# Patient Record
Sex: FEMALE | Race: WHITE | Employment: FULL TIME | URBAN - METROPOLITAN AREA
[De-identification: names, ages, dates, MRNs, and addresses within clinical notes are randomized per-mention and may not be internally consistent; named-entity substitution may affect disease eponyms.]

---

## 2017-01-13 DIAGNOSIS — Z91.09 ENVIRONMENTAL ALLERGIES: ICD-10-CM

## 2017-01-13 RX ORDER — MONTELUKAST SODIUM 10 MG/1
TABLET ORAL
Qty: 30 TABLET | Refills: 10 | Status: SHIPPED | OUTPATIENT
Start: 2017-01-13 | End: 2018-01-25 | Stop reason: SDUPTHER

## 2017-09-08 ENCOUNTER — OFFICE VISIT (OUTPATIENT)
Dept: FAMILY MEDICINE CLINIC | Age: 37
End: 2017-09-08

## 2017-09-08 VITALS
BODY MASS INDEX: 23.22 KG/M2 | SYSTOLIC BLOOD PRESSURE: 106 MMHG | OXYGEN SATURATION: 97 % | WEIGHT: 136 LBS | DIASTOLIC BLOOD PRESSURE: 56 MMHG | HEIGHT: 64 IN | HEART RATE: 82 BPM | TEMPERATURE: 98.5 F

## 2017-09-08 DIAGNOSIS — Z00.00 PREVENTATIVE HEALTH CARE: Primary | ICD-10-CM

## 2017-09-08 DIAGNOSIS — Z23 NEED FOR INFLUENZA VACCINATION: ICD-10-CM

## 2017-09-08 DIAGNOSIS — G43.109 MIGRAINE WITH AURA AND WITHOUT STATUS MIGRAINOSUS, NOT INTRACTABLE: ICD-10-CM

## 2017-09-08 DIAGNOSIS — F41.9 ANXIETY: ICD-10-CM

## 2017-09-08 PROCEDURE — 90471 IMMUNIZATION ADMIN: CPT | Performed by: FAMILY MEDICINE

## 2017-09-08 PROCEDURE — 90688 IIV4 VACCINE SPLT 0.5 ML IM: CPT | Performed by: FAMILY MEDICINE

## 2017-09-08 PROCEDURE — 99395 PREV VISIT EST AGE 18-39: CPT | Performed by: FAMILY MEDICINE

## 2017-09-08 RX ORDER — BUPROPION HYDROCHLORIDE 150 MG/1
150 TABLET ORAL EVERY MORNING
Qty: 30 TABLET | Refills: 3 | Status: SHIPPED | OUTPATIENT
Start: 2017-09-08 | End: 2018-07-30

## 2017-09-08 RX ORDER — RIZATRIPTAN BENZOATE 10 MG/1
10 TABLET, ORALLY DISINTEGRATING ORAL
Qty: 12 TABLET | Refills: 3 | Status: SHIPPED | OUTPATIENT
Start: 2017-09-08 | End: 2018-02-20 | Stop reason: SDUPTHER

## 2017-09-08 ASSESSMENT — PATIENT HEALTH QUESTIONNAIRE - PHQ9
SUM OF ALL RESPONSES TO PHQ9 QUESTIONS 1 & 2: 0
2. FEELING DOWN, DEPRESSED OR HOPELESS: 0
1. LITTLE INTEREST OR PLEASURE IN DOING THINGS: 0
SUM OF ALL RESPONSES TO PHQ QUESTIONS 1-9: 0

## 2018-01-25 DIAGNOSIS — Z91.09 ENVIRONMENTAL ALLERGIES: ICD-10-CM

## 2018-01-25 RX ORDER — MONTELUKAST SODIUM 10 MG/1
TABLET ORAL
Qty: 30 TABLET | Refills: 5 | Status: SHIPPED | OUTPATIENT
Start: 2018-01-25 | End: 2018-07-30 | Stop reason: SDUPTHER

## 2018-02-20 ENCOUNTER — OFFICE VISIT (OUTPATIENT)
Dept: FAMILY MEDICINE CLINIC | Age: 38
End: 2018-02-20

## 2018-02-20 VITALS
HEIGHT: 64 IN | WEIGHT: 135 LBS | DIASTOLIC BLOOD PRESSURE: 62 MMHG | SYSTOLIC BLOOD PRESSURE: 106 MMHG | OXYGEN SATURATION: 97 % | TEMPERATURE: 97.7 F | BODY MASS INDEX: 23.05 KG/M2 | HEART RATE: 91 BPM

## 2018-02-20 DIAGNOSIS — F41.9 ANXIETY: Primary | ICD-10-CM

## 2018-02-20 DIAGNOSIS — G43.109 MIGRAINE WITH AURA AND WITHOUT STATUS MIGRAINOSUS, NOT INTRACTABLE: ICD-10-CM

## 2018-02-20 PROCEDURE — 99214 OFFICE O/P EST MOD 30 MIN: CPT | Performed by: FAMILY MEDICINE

## 2018-02-20 RX ORDER — ESCITALOPRAM OXALATE 10 MG/1
5-10 TABLET ORAL DAILY
Qty: 30 TABLET | Refills: 3 | Status: SHIPPED | OUTPATIENT
Start: 2018-02-20 | End: 2018-06-28 | Stop reason: SDUPTHER

## 2018-02-20 RX ORDER — RIZATRIPTAN BENZOATE 10 MG/1
10 TABLET, ORALLY DISINTEGRATING ORAL
Qty: 12 TABLET | Refills: 3 | Status: SHIPPED | OUTPATIENT
Start: 2018-02-20 | End: 2018-07-30 | Stop reason: SDUPTHER

## 2018-03-16 ENCOUNTER — OFFICE VISIT (OUTPATIENT)
Dept: PSYCHOLOGY | Age: 38
End: 2018-03-16

## 2018-03-16 DIAGNOSIS — F41.1 GAD (GENERALIZED ANXIETY DISORDER): Primary | ICD-10-CM

## 2018-03-16 PROCEDURE — 90791 PSYCH DIAGNOSTIC EVALUATION: CPT | Performed by: PSYCHOLOGIST

## 2018-03-16 ASSESSMENT — ANXIETY QUESTIONNAIRES
6. BECOMING EASILY ANNOYED OR IRRITABLE: 3-NEARLY EVERY DAY
7. FEELING AFRAID AS IF SOMETHING AWFUL MIGHT HAPPEN: 1-SEVERAL DAYS
GAD7 TOTAL SCORE: 17
3. WORRYING TOO MUCH ABOUT DIFFERENT THINGS: 2-OVER HALF THE DAYS
5. BEING SO RESTLESS THAT IT IS HARD TO SIT STILL: 2-OVER HALF THE DAYS
4. TROUBLE RELAXING: 3-NEARLY EVERY DAY
2. NOT BEING ABLE TO STOP OR CONTROL WORRYING: 3-NEARLY EVERY DAY
1. FEELING NERVOUS, ANXIOUS, OR ON EDGE: 3-NEARLY EVERY DAY

## 2018-03-16 ASSESSMENT — PATIENT HEALTH QUESTIONNAIRE - PHQ9
SUM OF ALL RESPONSES TO PHQ9 QUESTIONS 1 & 2: 2
3. TROUBLE FALLING OR STAYING ASLEEP: 3
7. TROUBLE CONCENTRATING ON THINGS, SUCH AS READING THE NEWSPAPER OR WATCHING TELEVISION: 3
4. FEELING TIRED OR HAVING LITTLE ENERGY: 3
1. LITTLE INTEREST OR PLEASURE IN DOING THINGS: 1
5. POOR APPETITE OR OVEREATING: 0
SUM OF ALL RESPONSES TO PHQ QUESTIONS 1-9: 12
2. FEELING DOWN, DEPRESSED OR HOPELESS: 1
9. THOUGHTS THAT YOU WOULD BE BETTER OFF DEAD, OR OF HURTING YOURSELF: 0
6. FEELING BAD ABOUT YOURSELF - OR THAT YOU ARE A FAILURE OR HAVE LET YOURSELF OR YOUR FAMILY DOWN: 1
8. MOVING OR SPEAKING SO SLOWLY THAT OTHER PEOPLE COULD HAVE NOTICED. OR THE OPPOSITE, BEING SO FIGETY OR RESTLESS THAT YOU HAVE BEEN MOVING AROUND A LOT MORE THAN USUAL: 0

## 2018-03-16 NOTE — PROGRESS NOTES
recent and remote memory intact  Attention/Concentration    intact  Morbid ideation No  Suicide Assessment    no suicidal ideation    History:    Medications:   Current Outpatient Prescriptions   Medication Sig Dispense Refill    escitalopram (LEXAPRO) 10 MG tablet Take 0.5-1 tablets by mouth daily 30 tablet 3    rizatriptan (MAXALT-MLT) 10 MG disintegrating tablet Take 1 tablet by mouth once as needed for Migraine May repeat in 2 hours if needed 12 tablet 3    montelukast (SINGULAIR) 10 MG tablet TAKE ONE TABLET BY MOUTH DAILY AS NEEDED FOR ALLERGIES 30 tablet 5    buPROPion (WELLBUTRIN XL) 150 MG extended release tablet Take 1 tablet by mouth every morning 30 tablet 3    ondansetron (ZOFRAN) 4 MG tablet Take 1 tablet by mouth every 8 hours as needed for Nausea or Vomiting 20 tablet 0    fluticasone (FLONASE) 50 MCG/ACT nasal spray 1 spray by Nasal route daily. 1 Bottle 3     No current facility-administered medications for this visit. Social History:   Social History     Social History    Marital status:      Spouse name: N/A    Number of children: N/A    Years of education: N/A     Occupational History    Not on file. Social History Main Topics    Smoking status: Never Smoker    Smokeless tobacco: Never Used    Alcohol use No    Drug use: No    Sexual activity: Yes     Partners: Male     Other Topics Concern    Not on file     Social History Narrative    No narrative on file     TOBACCO:   reports that she has never smoked. She has never used smokeless tobacco.  ETOH:   reports that she does not drink alcohol.   Family History:   Family History   Problem Relation Age of Onset    Cancer Paternal Grandfather      stomach cancer, skin cancer    Diabetes Paternal Grandfather     Heart Disease Paternal Grandfather     Diabetes Maternal Grandmother     Heart Disease Maternal Grandmother     Allergic Rhinitis Mother     Other Mother      Cholelith/MVA    High Blood Pressure

## 2018-04-04 ENCOUNTER — OFFICE VISIT (OUTPATIENT)
Dept: PSYCHOLOGY | Age: 38
End: 2018-04-04

## 2018-04-04 DIAGNOSIS — F41.1 GAD (GENERALIZED ANXIETY DISORDER): Primary | ICD-10-CM

## 2018-04-04 PROCEDURE — 90832 PSYTX W PT 30 MINUTES: CPT | Performed by: PSYCHOLOGIST

## 2018-04-20 ENCOUNTER — OFFICE VISIT (OUTPATIENT)
Dept: PSYCHOLOGY | Age: 38
End: 2018-04-20

## 2018-04-20 DIAGNOSIS — F41.1 GAD (GENERALIZED ANXIETY DISORDER): Primary | ICD-10-CM

## 2018-04-20 PROCEDURE — 90832 PSYTX W PT 30 MINUTES: CPT | Performed by: PSYCHOLOGIST

## 2018-04-20 ASSESSMENT — PATIENT HEALTH QUESTIONNAIRE - PHQ9
5. POOR APPETITE OR OVEREATING: 0
3. TROUBLE FALLING OR STAYING ASLEEP: 1
6. FEELING BAD ABOUT YOURSELF - OR THAT YOU ARE A FAILURE OR HAVE LET YOURSELF OR YOUR FAMILY DOWN: 0
SUM OF ALL RESPONSES TO PHQ QUESTIONS 1-9: 7
4. FEELING TIRED OR HAVING LITTLE ENERGY: 3
7. TROUBLE CONCENTRATING ON THINGS, SUCH AS READING THE NEWSPAPER OR WATCHING TELEVISION: 1
SUM OF ALL RESPONSES TO PHQ9 QUESTIONS 1 & 2: 2
2. FEELING DOWN, DEPRESSED OR HOPELESS: 1
8. MOVING OR SPEAKING SO SLOWLY THAT OTHER PEOPLE COULD HAVE NOTICED. OR THE OPPOSITE, BEING SO FIGETY OR RESTLESS THAT YOU HAVE BEEN MOVING AROUND A LOT MORE THAN USUAL: 0
9. THOUGHTS THAT YOU WOULD BE BETTER OFF DEAD, OR OF HURTING YOURSELF: 0
1. LITTLE INTEREST OR PLEASURE IN DOING THINGS: 1

## 2018-04-20 ASSESSMENT — ANXIETY QUESTIONNAIRES
1. FEELING NERVOUS, ANXIOUS, OR ON EDGE: 1-SEVERAL DAYS
6. BECOMING EASILY ANNOYED OR IRRITABLE: 1-SEVERAL DAYS
GAD7 TOTAL SCORE: 3
7. FEELING AFRAID AS IF SOMETHING AWFUL MIGHT HAPPEN: 0-NOT AT ALL SURE
3. WORRYING TOO MUCH ABOUT DIFFERENT THINGS: 0-NOT AT ALL SURE
4. TROUBLE RELAXING: 1-SEVERAL DAYS
5. BEING SO RESTLESS THAT IT IS HARD TO SIT STILL: 0-NOT AT ALL SURE
2. NOT BEING ABLE TO STOP OR CONTROL WORRYING: 0-NOT AT ALL SURE

## 2018-05-11 ENCOUNTER — OFFICE VISIT (OUTPATIENT)
Dept: PSYCHOLOGY | Age: 38
End: 2018-05-11

## 2018-05-11 DIAGNOSIS — F41.1 GAD (GENERALIZED ANXIETY DISORDER): Primary | ICD-10-CM

## 2018-05-11 PROCEDURE — 90832 PSYTX W PT 30 MINUTES: CPT | Performed by: PSYCHOLOGIST

## 2018-06-28 DIAGNOSIS — F41.9 ANXIETY: ICD-10-CM

## 2018-06-28 RX ORDER — ESCITALOPRAM OXALATE 10 MG/1
5-10 TABLET ORAL DAILY
Qty: 30 TABLET | Refills: 3 | Status: SHIPPED | OUTPATIENT
Start: 2018-06-28 | End: 2018-07-30 | Stop reason: SDUPTHER

## 2018-07-30 ENCOUNTER — OFFICE VISIT (OUTPATIENT)
Dept: FAMILY MEDICINE CLINIC | Age: 38
End: 2018-07-30

## 2018-07-30 VITALS
WEIGHT: 136.6 LBS | OXYGEN SATURATION: 98 % | DIASTOLIC BLOOD PRESSURE: 62 MMHG | SYSTOLIC BLOOD PRESSURE: 105 MMHG | BODY MASS INDEX: 23.45 KG/M2 | HEART RATE: 91 BPM

## 2018-07-30 DIAGNOSIS — F41.9 ANXIETY: ICD-10-CM

## 2018-07-30 DIAGNOSIS — Z91.09 ENVIRONMENTAL ALLERGIES: ICD-10-CM

## 2018-07-30 DIAGNOSIS — G43.109 MIGRAINE WITH AURA AND WITHOUT STATUS MIGRAINOSUS, NOT INTRACTABLE: ICD-10-CM

## 2018-07-30 PROCEDURE — 99214 OFFICE O/P EST MOD 30 MIN: CPT | Performed by: NURSE PRACTITIONER

## 2018-07-30 RX ORDER — MONTELUKAST SODIUM 10 MG/1
TABLET ORAL
Qty: 30 TABLET | Refills: 5 | Status: SHIPPED | OUTPATIENT
Start: 2018-07-30 | End: 2019-04-16 | Stop reason: SDUPTHER

## 2018-07-30 RX ORDER — ESCITALOPRAM OXALATE 10 MG/1
10 TABLET ORAL DAILY
Qty: 30 TABLET | Refills: 5 | Status: SHIPPED | OUTPATIENT
Start: 2018-07-30 | End: 2019-04-16 | Stop reason: SDUPTHER

## 2018-07-30 RX ORDER — PROMETHAZINE HYDROCHLORIDE 12.5 MG/1
12.5 TABLET ORAL EVERY 8 HOURS PRN
Qty: 20 TABLET | Refills: 5 | Status: SHIPPED | OUTPATIENT
Start: 2018-07-30 | End: 2019-11-04 | Stop reason: SDUPTHER

## 2018-07-30 RX ORDER — RIZATRIPTAN BENZOATE 10 MG/1
10 TABLET, ORALLY DISINTEGRATING ORAL
Qty: 12 TABLET | Refills: 5 | Status: SHIPPED | OUTPATIENT
Start: 2018-07-30 | End: 2018-12-12

## 2018-07-30 RX ORDER — PROMETHAZINE HYDROCHLORIDE 25 MG/1
25 TABLET ORAL EVERY 8 HOURS PRN
COMMUNITY
End: 2018-07-30

## 2018-07-30 RX ORDER — PROMETHAZINE HYDROCHLORIDE 12.5 MG/1
12.5 TABLET ORAL EVERY 8 HOURS PRN
COMMUNITY
End: 2018-07-30 | Stop reason: SDUPTHER

## 2018-07-30 RX ORDER — ONDANSETRON 4 MG/1
4 TABLET, FILM COATED ORAL EVERY 8 HOURS PRN
Qty: 20 TABLET | Refills: 5 | Status: SHIPPED | OUTPATIENT
Start: 2018-07-30 | End: 2019-11-04 | Stop reason: SDUPTHER

## 2018-07-30 ASSESSMENT — ENCOUNTER SYMPTOMS
COUGH: 0
SHORTNESS OF BREATH: 0
WHEEZING: 0

## 2018-07-30 NOTE — PROGRESS NOTES
5    2. Anxiety    - escitalopram (LEXAPRO) 10 MG tablet; Take 1 tablet by mouth daily  Dispense: 30 tablet; Refill: 5    3. Environmental allergies    - montelukast (SINGULAIR) 10 MG tablet; TAKE ONE TABLET BY MOUTH DAILY AS NEEDED FOR ALLERGIES  Dispense: 30 tablet;  Refill: 5

## 2018-10-26 ENCOUNTER — OFFICE VISIT (OUTPATIENT)
Dept: FAMILY MEDICINE CLINIC | Age: 38
End: 2018-10-26
Payer: COMMERCIAL

## 2018-10-26 VITALS
DIASTOLIC BLOOD PRESSURE: 78 MMHG | OXYGEN SATURATION: 97 % | BODY MASS INDEX: 24.2 KG/M2 | HEART RATE: 73 BPM | SYSTOLIC BLOOD PRESSURE: 118 MMHG | WEIGHT: 141 LBS

## 2018-10-26 DIAGNOSIS — G43.009 MIGRAINE WITHOUT AURA AND WITHOUT STATUS MIGRAINOSUS, NOT INTRACTABLE: Primary | ICD-10-CM

## 2018-10-26 PROCEDURE — 99214 OFFICE O/P EST MOD 30 MIN: CPT | Performed by: NURSE PRACTITIONER

## 2018-10-26 ASSESSMENT — ENCOUNTER SYMPTOMS
RHINORRHEA: 1
SINUS PRESSURE: 1
EYES NEGATIVE: 1
CHEST TIGHTNESS: 0
SCALP TENDERNESS: 1
SINUS PAIN: 1
NAUSEA: 1

## 2018-10-29 ENCOUNTER — TELEPHONE (OUTPATIENT)
Dept: FAMILY MEDICINE CLINIC | Age: 38
End: 2018-10-29

## 2018-12-12 ENCOUNTER — INITIAL CONSULT (OUTPATIENT)
Dept: NEUROLOGY | Age: 38
End: 2018-12-12
Payer: COMMERCIAL

## 2018-12-12 VITALS
WEIGHT: 138 LBS | BODY MASS INDEX: 23.56 KG/M2 | HEART RATE: 71 BPM | DIASTOLIC BLOOD PRESSURE: 63 MMHG | HEIGHT: 64 IN | SYSTOLIC BLOOD PRESSURE: 103 MMHG | OXYGEN SATURATION: 97 %

## 2018-12-12 DIAGNOSIS — G43.119 INTRACTABLE MIGRAINE WITH AURA WITHOUT STATUS MIGRAINOSUS: Primary | ICD-10-CM

## 2018-12-12 DIAGNOSIS — F34.1 DYSTHYMIA: ICD-10-CM

## 2018-12-12 PROCEDURE — 99203 OFFICE O/P NEW LOW 30 MIN: CPT | Performed by: PSYCHIATRY & NEUROLOGY

## 2018-12-12 RX ORDER — M-VIT,TX,IRON,MINS/CALC/FOLIC 27MG-0.4MG
1 TABLET ORAL DAILY
COMMUNITY

## 2018-12-12 NOTE — LETTER
RIBOFLAVIN PO Take by mouth Yes Historical Provider, MD   MAGNESIUM PO Take by mouth Yes Historical Provider, MD   IRON PO Take by mouth Yes Historical Provider, MD   Erenumab-aooe (AIMOVIG) 70 MG/ML SOAJ Inject 70 mg into the skin every 30 days Yes GISELA Millard CNP   promethazine (PHENERGAN) 12.5 MG tablet Take 1 tablet by mouth every 8 hours as needed for Nausea Yes GISELA Headley CNP   ondansetron (ZOFRAN) 4 MG tablet Take 1 tablet by mouth every 8 hours as needed for Nausea or Vomiting Yes GISELA Headley CNP   escitalopram (LEXAPRO) 10 MG tablet Take 1 tablet by mouth daily Yes GISELA Headley CNP   fluticasone (FLONASE) 50 MCG/ACT nasal spray 1 spray by Nasal route daily.  Yes Joo Mehta MD   montelukast (SINGULAIR) 10 MG tablet TAKE ONE TABLET BY MOUTH DAILY AS NEEDED FOR ALLERGIES  GISELA Headley CNP     No Known Allergies  Social History   Substance Use Topics    Smoking status: Never Smoker    Smokeless tobacco: Never Used    Alcohol use No     Family History   Problem Relation Age of Onset    Cancer Paternal Grandfather         stomach cancer, skin cancer    Diabetes Paternal Grandfather     Heart Disease Paternal Grandfather     Diabetes Maternal Grandmother     Heart Disease Maternal Grandmother     Allergic Rhinitis Mother     Other Mother         Cholelith/MVA    High Blood Pressure Father     Mental Illness Sister     Migraines Sister      Past Surgical History:   Procedure Laterality Date    LEG SURGERY Left 1995    had ben placed in upper left leg and then removed 1996       ROS : A 10-12 system review of constitutional, cardiovascular, respiratory, musculoskeletal, endocrine, skin, SHEENT, genitourinary, psychiatric and neurologic systems was obtained and updated today and is unremarkable except as mentioned in my HPI      Vitals:    12/12/18 1027   BP: 103/63   Pulse: 71   SpO2: 97%   Weight: 138 lb (62.6 kg)

## 2018-12-12 NOTE — PROGRESS NOTES
Nerves:   II: Visual fields: Full to confrontation  III: Pupils: equal, round, reactive to light  III,IV,VI: Extra Ocular Movements are intact. No nystagmus  V: Facial sensation is intact to pin prick and light touch  VII: Facial strength and movements: intact and symmetric smile,cheek puffing and eyebrow elevation  VIII: Hearing: Intact to finger rub bilaterally  IX: Palate elevation is symmetric  XI: Shoulder shrug is intact  XII: Tongue movements are normal  Musculoskeletal: 5/5 in all 4 extremities. Normal tone. Reflexes: Bilateral biceps 2/4, triceps 2/4, brachial radialis 2/4, knee 2/4 and ankle 2/4. Planters: flexor bilaterally. Coordination: no pronator drift, no dysmetria. Finger nose finger testing within normal limits. Sensation: normal to all modalities. Gait/Posture: steady      Medical decision making:  I personally reviewed and updated social history, past medical history, medications, allergy, surgical history, and family history as documented in the patient's electronic health records. Labs and/or neuroimaging and other test results reviewed and discussed with the patient. Reviewed notes from other physicians. Provided patient education regarding risk, benefits and treatment options as well as adherence to medication regimen and side effect from these medications. Diagnosis Orders   1. Intractable migraine with aura without status migrainosus     2. Dysthymia           Plan: For migraine prevention: Continue Aimovig injection. Long discussion with the patient regarding side effect of such medication and possible effect on pregnancy, labor and delivery. Continue SSRI  Continue Maxalt or other anti-inflammatory medication for migraine rescue and cyclic migraines.    Continue riboflavin and magnesium  Improving sleep hygiene  Repeat eye exam  Hydration  Headache diary  Follow-up in 3 month  ----------------------------------------------------------------    Patient's instructions:  1- The risk of rebound headaches were discussed. 2- Limit the use of any rescue medications to two days per week. 3- Limit Sodas and Coffee and increase hydration. 4- Migraine diary. 5- Improving sleep hygiene and avoid triggers. 6- The relation between headaches and mood disorders were addressed. 7- Preventive and rescue medications discussion.

## 2019-04-05 ENCOUNTER — OFFICE VISIT (OUTPATIENT)
Dept: FAMILY MEDICINE CLINIC | Age: 39
End: 2019-04-05
Payer: COMMERCIAL

## 2019-04-05 VITALS
HEART RATE: 87 BPM | DIASTOLIC BLOOD PRESSURE: 64 MMHG | OXYGEN SATURATION: 98 % | WEIGHT: 141.6 LBS | SYSTOLIC BLOOD PRESSURE: 118 MMHG | BODY MASS INDEX: 24.17 KG/M2 | HEIGHT: 64 IN

## 2019-04-05 DIAGNOSIS — J02.9 SORE THROAT: Primary | ICD-10-CM

## 2019-04-05 LAB — S PYO AG THROAT QL: NORMAL

## 2019-04-05 PROCEDURE — 99213 OFFICE O/P EST LOW 20 MIN: CPT | Performed by: PHYSICIAN ASSISTANT

## 2019-04-05 PROCEDURE — 87880 STREP A ASSAY W/OPTIC: CPT | Performed by: PHYSICIAN ASSISTANT

## 2019-04-05 NOTE — PROGRESS NOTES
SUBJECTIVE:   Ander Meeks is a 45 y.o. female who complains of congestion, sore throat, post nasal drip and bilateral sinus pain for 3-4 days. She denies a history of fevers and denies a history of asthma. Patient denies smoke cigarettes. No medications taken. OBJECTIVE:  She appears well, vital signs are as noted. Ears normal.  Throat and pharynx with mild erythema and PND. Neck supple. Pos adenopathy in the neck. Nose is not congested. Sinuses non tender. The chest is clear, without wheezes or rales. ASSESSMENT:    Diagnosis Orders   1. Sore throat  POCT rapid strep A         PLAN:  Symptomatic therapy suggested: push fluids and rest. Lack of antibiotic effectiveness discussed with her. Call or return to clinic prn if these symptoms worsen or fail to improve as anticipated.

## 2019-04-16 ENCOUNTER — TELEPHONE (OUTPATIENT)
Dept: FAMILY MEDICINE CLINIC | Age: 39
End: 2019-04-16

## 2019-04-16 DIAGNOSIS — G43.009 MIGRAINE WITHOUT AURA AND WITHOUT STATUS MIGRAINOSUS, NOT INTRACTABLE: ICD-10-CM

## 2019-04-16 DIAGNOSIS — F41.9 ANXIETY: ICD-10-CM

## 2019-04-16 DIAGNOSIS — Z91.09 ENVIRONMENTAL ALLERGIES: ICD-10-CM

## 2019-04-16 RX ORDER — MONTELUKAST SODIUM 10 MG/1
TABLET ORAL
Qty: 30 TABLET | Refills: 5 | Status: SHIPPED | OUTPATIENT
Start: 2019-04-16 | End: 2019-11-04 | Stop reason: SDUPTHER

## 2019-04-16 RX ORDER — ESCITALOPRAM OXALATE 10 MG/1
10 TABLET ORAL DAILY
Qty: 30 TABLET | Refills: 5 | Status: SHIPPED | OUTPATIENT
Start: 2019-04-16 | End: 2019-11-04 | Stop reason: SDUPTHER

## 2019-04-16 NOTE — TELEPHONE ENCOUNTER
Submitted PA for Aimovig 140 Dose 70MG/ML SC SOAJ,  Key: [id number]. Medication has been APPROVED. Please advise patient. Thank you.

## 2019-04-16 NOTE — TELEPHONE ENCOUNTER
Please contact patient. If she is still wanting to take the medication then please move forward with it. If she needs education on how to use the medication, please set up a nurse visit.

## 2019-06-13 ENCOUNTER — OFFICE VISIT (OUTPATIENT)
Dept: NEUROLOGY | Age: 39
End: 2019-06-13
Payer: COMMERCIAL

## 2019-06-13 VITALS
SYSTOLIC BLOOD PRESSURE: 91 MMHG | DIASTOLIC BLOOD PRESSURE: 58 MMHG | OXYGEN SATURATION: 95 % | HEIGHT: 64 IN | BODY MASS INDEX: 23.56 KG/M2 | WEIGHT: 138 LBS | HEART RATE: 84 BPM

## 2019-06-13 DIAGNOSIS — G43.009 MIGRAINE WITHOUT AURA AND WITHOUT STATUS MIGRAINOSUS, NOT INTRACTABLE: ICD-10-CM

## 2019-06-13 DIAGNOSIS — G43.119 INTRACTABLE MIGRAINE WITH AURA WITHOUT STATUS MIGRAINOSUS: Primary | ICD-10-CM

## 2019-06-13 DIAGNOSIS — F34.1 DYSTHYMIA: ICD-10-CM

## 2019-06-13 PROCEDURE — 99213 OFFICE O/P EST LOW 20 MIN: CPT | Performed by: PSYCHIATRY & NEUROLOGY

## 2019-06-13 NOTE — PROGRESS NOTES
Use    Smoking status: Never Smoker    Smokeless tobacco: Never Used   Substance Use Topics    Alcohol use: No     Family History   Problem Relation Age of Onset    Cancer Paternal Grandfather         stomach cancer, skin cancer    Diabetes Paternal Grandfather     Heart Disease Paternal Grandfather     Diabetes Maternal Grandmother     Heart Disease Maternal Grandmother     Allergic Rhinitis Mother     Other Mother         Cholelith/MVA    High Blood Pressure Father     Mental Illness Sister     Migraines Sister      Past Surgical History:   Procedure Laterality Date    LEG SURGERY Left 1995    had ben placed in upper left leg and then removed 1996           Exam:   Constitutional:   Vitals:    06/13/19 1055   BP: (!) 91/58   Pulse: 84   SpO2: 95%   Weight: 138 lb (62.6 kg)   Height: 5' 4\" (1.626 m)       General appearance:  NAD. Eye: No icterus. Neck: supple  Cardiovascular:   No lower leg edema with good pulsation. Mental Status:   Oriented to person, place, problem, and time. Memory: Aware of recent and remote event. Good immediate recall. Intact remote memory  Normal attention span and concentration. Language: intact naming, repeating and fluency   Good fund of Knowledge. Aware of current events and vocabulary   Cranial Nerves:   II: Visual fields: Full to confrontation and nl [facility]. Pupils: equal, round, reactive to light  III,IV,VI: Extra Ocular Movements are intact. No nystagmus  V: Facial sensation is intact   VII: Facial strength and movements: intact and symmetric  VIII: Hearing: Intact to finger rub bilaterally  IX: Palate elevation is symmetric  XI: Shoulder shrug is intact  XII: Tongue movements are normal  Musculoskeletal: 5/5 in all 4 extremities. Tone: Normal tone. Reflexes: Bilateral biceps 2/4, triceps 2/4, brachial radialis 2/4, knee 2/4 and ankle 2/4. Planters: flexor bilaterally. Coordination: no pronator drift, no dysmetria with FNF. Normal REM.    Sensation:

## 2019-11-04 ENCOUNTER — OFFICE VISIT (OUTPATIENT)
Dept: FAMILY MEDICINE CLINIC | Age: 39
End: 2019-11-04
Payer: COMMERCIAL

## 2019-11-04 VITALS
SYSTOLIC BLOOD PRESSURE: 102 MMHG | OXYGEN SATURATION: 98 % | BODY MASS INDEX: 25.06 KG/M2 | TEMPERATURE: 99.1 F | HEART RATE: 78 BPM | DIASTOLIC BLOOD PRESSURE: 62 MMHG | WEIGHT: 146 LBS

## 2019-11-04 DIAGNOSIS — F41.9 ANXIETY: ICD-10-CM

## 2019-11-04 DIAGNOSIS — G43.119 INTRACTABLE MIGRAINE WITH AURA WITHOUT STATUS MIGRAINOSUS: ICD-10-CM

## 2019-11-04 DIAGNOSIS — Z91.09 ENVIRONMENTAL ALLERGIES: ICD-10-CM

## 2019-11-04 DIAGNOSIS — Z23 NEEDS FLU SHOT: ICD-10-CM

## 2019-11-04 DIAGNOSIS — G43.109 MIGRAINE WITH AURA AND WITHOUT STATUS MIGRAINOSUS, NOT INTRACTABLE: ICD-10-CM

## 2019-11-04 DIAGNOSIS — Z00.00 PHYSICAL EXAM: Primary | ICD-10-CM

## 2019-11-04 PROCEDURE — 99395 PREV VISIT EST AGE 18-39: CPT | Performed by: NURSE PRACTITIONER

## 2019-11-04 PROCEDURE — 90686 IIV4 VACC NO PRSV 0.5 ML IM: CPT | Performed by: NURSE PRACTITIONER

## 2019-11-04 PROCEDURE — 90471 IMMUNIZATION ADMIN: CPT | Performed by: NURSE PRACTITIONER

## 2019-11-04 RX ORDER — ESCITALOPRAM OXALATE 10 MG/1
10 TABLET ORAL DAILY
Qty: 30 TABLET | Refills: 5 | Status: SHIPPED | OUTPATIENT
Start: 2019-11-04 | End: 2019-11-26 | Stop reason: SDUPTHER

## 2019-11-04 RX ORDER — MONTELUKAST SODIUM 10 MG/1
TABLET ORAL
Qty: 30 TABLET | Refills: 5 | Status: SHIPPED | OUTPATIENT
Start: 2019-11-04 | End: 2020-08-10 | Stop reason: SDUPTHER

## 2019-11-04 RX ORDER — ONDANSETRON 4 MG/1
4 TABLET, FILM COATED ORAL EVERY 8 HOURS PRN
Qty: 20 TABLET | Refills: 5 | Status: SHIPPED | OUTPATIENT
Start: 2019-11-04 | End: 2021-08-23 | Stop reason: SDUPTHER

## 2019-11-04 RX ORDER — PROMETHAZINE HYDROCHLORIDE 12.5 MG/1
12.5 TABLET ORAL EVERY 8 HOURS PRN
Qty: 20 TABLET | Refills: 5 | Status: SHIPPED | OUTPATIENT
Start: 2019-11-04 | End: 2021-08-23 | Stop reason: SDUPTHER

## 2019-11-04 ASSESSMENT — ENCOUNTER SYMPTOMS
CONSTIPATION: 0
SORE THROAT: 0
EYE PAIN: 0
EYE ITCHING: 0
DIARRHEA: 0
SHORTNESS OF BREATH: 0
WHEEZING: 0
ABDOMINAL PAIN: 1

## 2019-11-04 ASSESSMENT — PATIENT HEALTH QUESTIONNAIRE - PHQ9
1. LITTLE INTEREST OR PLEASURE IN DOING THINGS: 1
SUM OF ALL RESPONSES TO PHQ9 QUESTIONS 1 & 2: 2
SUM OF ALL RESPONSES TO PHQ QUESTIONS 1-9: 2
SUM OF ALL RESPONSES TO PHQ QUESTIONS 1-9: 2
2. FEELING DOWN, DEPRESSED OR HOPELESS: 1

## 2019-11-11 ENCOUNTER — NURSE ONLY (OUTPATIENT)
Dept: FAMILY MEDICINE CLINIC | Age: 39
End: 2019-11-11
Payer: COMMERCIAL

## 2019-11-11 DIAGNOSIS — Z00.00 PHYSICAL EXAM: ICD-10-CM

## 2019-11-11 LAB
A/G RATIO: 2.2 (ref 1.1–2.2)
ALBUMIN SERPL-MCNC: 4.7 G/DL (ref 3.4–5)
ALP BLD-CCNC: 53 U/L (ref 40–129)
ALT SERPL-CCNC: 11 U/L (ref 10–40)
ANION GAP SERPL CALCULATED.3IONS-SCNC: 13 MMOL/L (ref 3–16)
AST SERPL-CCNC: 14 U/L (ref 15–37)
BASOPHILS ABSOLUTE: 0.1 K/UL (ref 0–0.2)
BASOPHILS RELATIVE PERCENT: 0.9 %
BILIRUB SERPL-MCNC: 0.3 MG/DL (ref 0–1)
BUN BLDV-MCNC: 15 MG/DL (ref 7–20)
CALCIUM SERPL-MCNC: 8.9 MG/DL (ref 8.3–10.6)
CHLORIDE BLD-SCNC: 100 MMOL/L (ref 99–110)
CHOLESTEROL, TOTAL: 189 MG/DL (ref 0–199)
CO2: 27 MMOL/L (ref 21–32)
CREAT SERPL-MCNC: 0.8 MG/DL (ref 0.6–1.1)
EOSINOPHILS ABSOLUTE: 0.3 K/UL (ref 0–0.6)
EOSINOPHILS RELATIVE PERCENT: 4.9 %
GFR AFRICAN AMERICAN: >60
GFR NON-AFRICAN AMERICAN: >60
GLOBULIN: 2.1 G/DL
GLUCOSE BLD-MCNC: 87 MG/DL (ref 70–99)
HCT VFR BLD CALC: 39.8 % (ref 36–48)
HDLC SERPL-MCNC: 47 MG/DL (ref 40–60)
HEMOGLOBIN: 12.9 G/DL (ref 12–16)
LDL CHOLESTEROL CALCULATED: 124 MG/DL
LYMPHOCYTES ABSOLUTE: 1.8 K/UL (ref 1–5.1)
LYMPHOCYTES RELATIVE PERCENT: 27.8 %
MCH RBC QN AUTO: 28.8 PG (ref 26–34)
MCHC RBC AUTO-ENTMCNC: 32.5 G/DL (ref 31–36)
MCV RBC AUTO: 88.7 FL (ref 80–100)
MONOCYTES ABSOLUTE: 0.5 K/UL (ref 0–1.3)
MONOCYTES RELATIVE PERCENT: 7.2 %
NEUTROPHILS ABSOLUTE: 3.9 K/UL (ref 1.7–7.7)
NEUTROPHILS RELATIVE PERCENT: 59.2 %
PDW BLD-RTO: 13.5 % (ref 12.4–15.4)
PLATELET # BLD: 291 K/UL (ref 135–450)
PMV BLD AUTO: 8.4 FL (ref 5–10.5)
POTASSIUM SERPL-SCNC: 4.3 MMOL/L (ref 3.5–5.1)
RBC # BLD: 4.49 M/UL (ref 4–5.2)
SODIUM BLD-SCNC: 140 MMOL/L (ref 136–145)
TOTAL PROTEIN: 6.8 G/DL (ref 6.4–8.2)
TRIGL SERPL-MCNC: 91 MG/DL (ref 0–150)
VLDLC SERPL CALC-MCNC: 18 MG/DL
WBC # BLD: 6.6 K/UL (ref 4–11)

## 2019-11-11 PROCEDURE — 36415 COLL VENOUS BLD VENIPUNCTURE: CPT | Performed by: NURSE PRACTITIONER

## 2019-11-26 DIAGNOSIS — F41.9 ANXIETY: ICD-10-CM

## 2019-11-26 DIAGNOSIS — Z23 NEEDS FLU SHOT: ICD-10-CM

## 2019-11-26 RX ORDER — ESCITALOPRAM OXALATE 20 MG/1
20 TABLET ORAL DAILY
Qty: 30 TABLET | Refills: 5 | Status: SHIPPED | OUTPATIENT
Start: 2019-11-26 | End: 2020-06-02 | Stop reason: SDUPTHER

## 2020-03-31 ENCOUNTER — TELEPHONE (OUTPATIENT)
Dept: FAMILY MEDICINE CLINIC | Age: 40
End: 2020-03-31

## 2020-03-31 NOTE — TELEPHONE ENCOUNTER
ears and face is hurting. patient had a cold for about two weeks but its going away. Both feet have spots all over. Left heel hurts. Patient described as broken blood vessels in her feet.  also has a headache off and on for the last 3days

## 2020-03-31 NOTE — TELEPHONE ENCOUNTER
Try OTC meds for cold/sinus like flonase, sudafed etc for feet- has she been on her feet a lot?  For left heel pain she can do some heel stretches- could possibly try some compression stocking for her feet

## 2020-05-26 ENCOUNTER — TELEPHONE (OUTPATIENT)
Dept: FAMILY MEDICINE CLINIC | Age: 40
End: 2020-05-26

## 2020-05-26 NOTE — TELEPHONE ENCOUNTER
Patient is needing a pre op appointment    Surgery date: July 14    Surgery Type/ Body Part: jaw surgery    Surgeon Name : Dr Tyar Robbins    Location for Surgery (what hospital/ office) : [facility]    EKG Needed: unknown

## 2020-06-02 RX ORDER — ESCITALOPRAM OXALATE 20 MG/1
20 TABLET ORAL DAILY
Qty: 30 TABLET | Refills: 5 | Status: SHIPPED
Start: 2020-06-02 | End: 2020-06-12 | Stop reason: ALTCHOICE

## 2020-06-02 RX ORDER — ERENUMAB-AOOE 70 MG/ML
70 INJECTION SUBCUTANEOUS
Qty: 1 PEN | Refills: 5 | Status: SHIPPED | OUTPATIENT
Start: 2020-06-02 | End: 2021-01-04

## 2020-06-11 ENCOUNTER — NURSE TRIAGE (OUTPATIENT)
Dept: OTHER | Facility: CLINIC | Age: 40
End: 2020-06-11

## 2020-06-12 ENCOUNTER — OFFICE VISIT (OUTPATIENT)
Dept: FAMILY MEDICINE CLINIC | Age: 40
End: 2020-06-12
Payer: COMMERCIAL

## 2020-06-12 VITALS
HEART RATE: 81 BPM | TEMPERATURE: 98 F | DIASTOLIC BLOOD PRESSURE: 62 MMHG | SYSTOLIC BLOOD PRESSURE: 112 MMHG | HEIGHT: 64 IN | OXYGEN SATURATION: 98 % | BODY MASS INDEX: 25.4 KG/M2 | WEIGHT: 148.8 LBS

## 2020-06-12 PROCEDURE — 90471 IMMUNIZATION ADMIN: CPT | Performed by: NURSE PRACTITIONER

## 2020-06-12 PROCEDURE — 99214 OFFICE O/P EST MOD 30 MIN: CPT | Performed by: NURSE PRACTITIONER

## 2020-06-12 PROCEDURE — 90715 TDAP VACCINE 7 YRS/> IM: CPT | Performed by: NURSE PRACTITIONER

## 2020-06-12 RX ORDER — TRIAMCINOLONE ACETONIDE 1 MG/G
CREAM TOPICAL
Qty: 45 G | Refills: 0 | Status: SHIPPED | OUTPATIENT
Start: 2020-06-12

## 2020-06-12 ASSESSMENT — ENCOUNTER SYMPTOMS: GASTROINTESTINAL NEGATIVE: 1

## 2020-06-19 ENCOUNTER — E-VISIT (OUTPATIENT)
Dept: FAMILY MEDICINE CLINIC | Age: 40
End: 2020-06-19
Payer: COMMERCIAL

## 2020-06-19 PROCEDURE — 99422 OL DIG E/M SVC 11-20 MIN: CPT | Performed by: FAMILY MEDICINE

## 2020-06-19 RX ORDER — METHYLPREDNISOLONE 4 MG/1
TABLET ORAL
Qty: 1 KIT | Refills: 0 | Status: SHIPPED | OUTPATIENT
Start: 2020-06-19 | End: 2020-07-01 | Stop reason: ALTCHOICE

## 2020-06-19 RX ORDER — HYDROXYZINE HYDROCHLORIDE 25 MG/1
25 TABLET, FILM COATED ORAL 3 TIMES DAILY PRN
Qty: 30 TABLET | Refills: 0 | Status: SHIPPED | OUTPATIENT
Start: 2020-06-19 | End: 2020-06-29

## 2020-06-19 NOTE — PROGRESS NOTES
HPI: per patient's questionnaire  On 6/12/2020 patient was seen by PCP and diagnosed with poison ivy dermatitis, after she fell into some brush, she was prescribed Kenalog cream      EXAM: not applicable    Diagnoses and all orders for this visit:    1. Poison ivy dermatitis  Recurrent  - methylPREDNISolone (MEDROL, BALTAZAR,) 4 MG tablet; Take by mouth, with food. Keep low carb, low salt diet while taking it  Dispense: 1 kit; Refill: 0  - hydrOXYzine (ATARAX) 25 MG tablet; Take 1 tablet by mouth 3 times daily as needed for Itching Causes sedation, take it when at home or at nighttime  Dispense: 30 tablet; Refill: 0  Continue Kenalog cream    Patient was advised to contact PCP if symptoms worsen or failing to change as expected    11-20 minutes were spent on the digital evaluation and management of this patient.

## 2020-07-01 ENCOUNTER — OFFICE VISIT (OUTPATIENT)
Dept: PRIMARY CARE CLINIC | Age: 40
End: 2020-07-01
Payer: COMMERCIAL

## 2020-07-01 PROCEDURE — 99211 OFF/OP EST MAY X REQ PHY/QHP: CPT | Performed by: NURSE PRACTITIONER

## 2020-07-01 NOTE — PROGRESS NOTES
Preoperative Screening for Elective Surgery/Invasive Procedures While COVID-19 present in the community     Have you tested positive or have been told to self-isolate for COVID-19 like symptoms within the past 28 days? no   Do you currently have any of the following symptoms? o Fever >100.0 F or 99.9 F in immunocompromised patients? no  o New onset cough, shortness of breath or difficulty breathing? no  o New onset sore throat, myalgia (muscle aches and pains), headache, loss of taste/smell or diarrhea? no   Have you had a potential exposure to COVID-19 within the past 14 days by:  o Close contact with a confirmed case? no  o Close contact with a healthcare worker,  or essential infrastructure worker (grocery store, restaurant, gas station, public utilities or transportation)? no  o Do you reside in a congregate setting such as; skilled nursing facility, adult home, correctional facility, homeless shelter or other institutional setting? no  o Have you had recent travel to a known COVID-19 hotspot? Indicate if the patient has a positive screen by answering yes to one or more of the above questions. Patients who test positive or screen positive prior to surgery or on the day of surgery should be evaluated in conjunction with the surgeon/proceduralist/anesthesiologist to determine the urgency of the procedure.

## 2020-07-01 NOTE — PROGRESS NOTES
Patient is having a/an jaw with Dr. Lazaro Tierney on 7/7/2020 and was seen at clinic for pre op covid test. . Patient instructed to self quarantine until the procedure.

## 2020-07-02 ENCOUNTER — OFFICE VISIT (OUTPATIENT)
Dept: FAMILY MEDICINE CLINIC | Age: 40
End: 2020-07-02
Payer: COMMERCIAL

## 2020-07-02 VITALS
BODY MASS INDEX: 27.74 KG/M2 | OXYGEN SATURATION: 98 % | HEART RATE: 85 BPM | DIASTOLIC BLOOD PRESSURE: 68 MMHG | SYSTOLIC BLOOD PRESSURE: 104 MMHG | TEMPERATURE: 98 F | WEIGHT: 161.6 LBS

## 2020-07-02 LAB
SARS-COV-2: NOT DETECTED
SOURCE: NORMAL

## 2020-07-02 PROCEDURE — 99242 OFF/OP CONSLTJ NEW/EST SF 20: CPT | Performed by: NURSE PRACTITIONER
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
## 2020-07-06 ENCOUNTER — ANESTHESIA EVENT (OUTPATIENT)
Dept: OPERATING ROOM | Age: 40
End: 2020-07-06
Payer: COMMERCIAL

## 2020-07-07 ENCOUNTER — ANESTHESIA (OUTPATIENT)
Dept: OPERATING ROOM | Age: 40
End: 2020-07-07
Payer: COMMERCIAL

## 2020-07-07 ENCOUNTER — HOSPITAL ENCOUNTER (OUTPATIENT)
Age: 40
Discharge: HOME | End: 2020-07-08
Attending: DENTIST | Admitting: INTERNAL MEDICINE
Payer: COMMERCIAL

## 2020-07-07 VITALS — DIASTOLIC BLOOD PRESSURE: 42 MMHG | SYSTOLIC BLOOD PRESSURE: 89 MMHG | TEMPERATURE: 96.8 F | OXYGEN SATURATION: 100 %

## 2020-07-07 PROBLEM — M26.4: Status: ACTIVE | Noted: 2020-07-07

## 2020-07-07 LAB
ABO/RH: NORMAL
ANTIBODY SCREEN: NORMAL
BASOPHILS ABSOLUTE: 0.1 K/UL (ref 0–0.2)
BASOPHILS RELATIVE PERCENT: 0.8 %
EOSINOPHILS ABSOLUTE: 0.4 K/UL (ref 0–0.6)
EOSINOPHILS RELATIVE PERCENT: 4.5 %
HCT VFR BLD CALC: 37.4 % (ref 36–48)
HEMOGLOBIN: 12.3 G/DL (ref 12–16)
LYMPHOCYTES ABSOLUTE: 1.9 K/UL (ref 1–5.1)
LYMPHOCYTES RELATIVE PERCENT: 24.9 %
MCH RBC QN AUTO: 28.6 PG (ref 26–34)
MCHC RBC AUTO-ENTMCNC: 33 G/DL (ref 31–36)
MCV RBC AUTO: 86.6 FL (ref 80–100)
MONOCYTES ABSOLUTE: 0.6 K/UL (ref 0–1.3)
MONOCYTES RELATIVE PERCENT: 7.9 %
NEUTROPHILS ABSOLUTE: 4.9 K/UL (ref 1.7–7.7)
NEUTROPHILS RELATIVE PERCENT: 61.9 %
PDW BLD-RTO: 13.4 % (ref 12.4–15.4)
PLATELET # BLD: 312 K/UL (ref 135–450)
PMV BLD AUTO: 7.6 FL (ref 5–10.5)
PREGNANCY, URINE: NEGATIVE
RBC # BLD: 4.32 M/UL (ref 4–5.2)
WBC # BLD: 7.8 K/UL (ref 4–11)

## 2020-07-07 PROCEDURE — 3700000001 HC ADD 15 MINUTES (ANESTHESIA): Performed by: DENTIST

## 2020-07-07 PROCEDURE — 2580000003 HC RX 258: Performed by: NURSE ANESTHETIST, CERTIFIED REGISTERED

## 2020-07-07 PROCEDURE — 86900 BLOOD TYPING SEROLOGIC ABO: CPT

## 2020-07-07 PROCEDURE — 7100000000 HC PACU RECOVERY - FIRST 15 MIN: Performed by: DENTIST

## 2020-07-07 PROCEDURE — 2500000003 HC RX 250 WO HCPCS: Performed by: NURSE ANESTHETIST, CERTIFIED REGISTERED

## 2020-07-07 PROCEDURE — C1713 ANCHOR/SCREW BN/BN,TIS/BN: HCPCS | Performed by: DENTIST

## 2020-07-07 PROCEDURE — 2709999900 HC NON-CHARGEABLE SUPPLY: Performed by: DENTIST

## 2020-07-07 PROCEDURE — 86901 BLOOD TYPING SEROLOGIC RH(D): CPT

## 2020-07-07 PROCEDURE — 6360000002 HC RX W HCPCS: Performed by: NURSE ANESTHETIST, CERTIFIED REGISTERED

## 2020-07-07 PROCEDURE — 94761 N-INVAS EAR/PLS OXIMETRY MLT: CPT

## 2020-07-07 PROCEDURE — 6360000002 HC RX W HCPCS: Performed by: ANESTHESIOLOGY

## 2020-07-07 PROCEDURE — 84703 CHORIONIC GONADOTROPIN ASSAY: CPT

## 2020-07-07 PROCEDURE — 3700000000 HC ANESTHESIA ATTENDED CARE: Performed by: DENTIST

## 2020-07-07 PROCEDURE — 6370000000 HC RX 637 (ALT 250 FOR IP): Performed by: DENTIST

## 2020-07-07 PROCEDURE — 2500000003 HC RX 250 WO HCPCS: Performed by: DENTIST

## 2020-07-07 PROCEDURE — 6360000002 HC RX W HCPCS

## 2020-07-07 PROCEDURE — 2700000000 HC OXYGEN THERAPY PER DAY

## 2020-07-07 PROCEDURE — 2780000010 HC IMPLANT OTHER: Performed by: DENTIST

## 2020-07-07 PROCEDURE — 3600000004 HC SURGERY LEVEL 4 BASE: Performed by: DENTIST

## 2020-07-07 PROCEDURE — 85025 COMPLETE CBC W/AUTO DIFF WBC: CPT

## 2020-07-07 PROCEDURE — 6360000002 HC RX W HCPCS: Performed by: DENTIST

## 2020-07-07 PROCEDURE — 86850 RBC ANTIBODY SCREEN: CPT

## 2020-07-07 PROCEDURE — 7100000001 HC PACU RECOVERY - ADDTL 15 MIN: Performed by: DENTIST

## 2020-07-07 PROCEDURE — 2580000003 HC RX 258: Performed by: DENTIST

## 2020-07-07 PROCEDURE — 2720000010 HC SURG SUPPLY STERILE: Performed by: DENTIST

## 2020-07-07 PROCEDURE — 3600000014 HC SURGERY LEVEL 4 ADDTL 15MIN: Performed by: DENTIST

## 2020-07-07 DEVICE — BONE SCREWS, AXS, SELF-DRILLING
Type: IMPLANTABLE DEVICE | Site: MAXILLA | Status: FUNCTIONAL
Brand: AXS

## 2020-07-07 DEVICE — BONE SCREWS, CROSS-PIN: Type: IMPLANTABLE DEVICE | Site: MANDIBLE | Status: FUNCTIONAL

## 2020-07-07 DEVICE — L-PLATE, 100 DEGREE, 2MM BAR, RIGHT, GSP: Type: IMPLANTABLE DEVICE | Site: MAXILLA | Status: FUNCTIONAL

## 2020-07-07 DEVICE — IMPLANTABLE DEVICE: Type: IMPLANTABLE DEVICE | Site: MAXILLA | Status: FUNCTIONAL

## 2020-07-07 DEVICE — L-PLATE, 100 DEGREE, 2MM BAR, LEFT, GSP: Type: IMPLANTABLE DEVICE | Site: MAXILLA | Status: FUNCTIONAL

## 2020-07-07 DEVICE — BONE SCREWS, CROSS-PIN: Type: IMPLANTABLE DEVICE | Site: MAXILLA | Status: FUNCTIONAL

## 2020-07-07 DEVICE — GRAFT BONE 1-4MM 15ML CRUSH CANC FRZ DRY: Type: IMPLANTABLE DEVICE | Site: MAXILLA | Status: FUNCTIONAL

## 2020-07-07 RX ORDER — BACITRACIN 500 [USP'U]/G
OINTMENT OPHTHALMIC PRN
Status: DISCONTINUED | OUTPATIENT
Start: 2020-07-07 | End: 2020-07-07 | Stop reason: HOSPADM

## 2020-07-07 RX ORDER — BUPIVACAINE HYDROCHLORIDE AND EPINEPHRINE 5; 5 MG/ML; UG/ML
INJECTION, SOLUTION PERINEURAL PRN
Status: DISCONTINUED | OUTPATIENT
Start: 2020-07-07 | End: 2020-07-07 | Stop reason: HOSPADM

## 2020-07-07 RX ORDER — HYDRALAZINE HYDROCHLORIDE 20 MG/ML
5 INJECTION INTRAMUSCULAR; INTRAVENOUS EVERY 30 MIN PRN
Status: DISCONTINUED | OUTPATIENT
Start: 2020-07-07 | End: 2020-07-07 | Stop reason: HOSPADM

## 2020-07-07 RX ORDER — CHLORHEXIDINE GLUCONATE 0.12 MG/ML
RINSE ORAL PRN
Status: DISCONTINUED | OUTPATIENT
Start: 2020-07-07 | End: 2020-07-07 | Stop reason: HOSPADM

## 2020-07-07 RX ORDER — OXYCODONE HYDROCHLORIDE 5 MG/1
5 TABLET ORAL EVERY 4 HOURS PRN
Status: DISCONTINUED | OUTPATIENT
Start: 2020-07-07 | End: 2020-07-08 | Stop reason: HOSPADM

## 2020-07-07 RX ORDER — MAGNESIUM HYDROXIDE 1200 MG/15ML
LIQUID ORAL CONTINUOUS PRN
Status: COMPLETED | OUTPATIENT
Start: 2020-07-07 | End: 2020-07-07

## 2020-07-07 RX ORDER — SODIUM CHLORIDE, SODIUM LACTATE, POTASSIUM CHLORIDE, CALCIUM CHLORIDE 600; 310; 30; 20 MG/100ML; MG/100ML; MG/100ML; MG/100ML
INJECTION, SOLUTION INTRAVENOUS CONTINUOUS
Status: DISCONTINUED | OUTPATIENT
Start: 2020-07-07 | End: 2020-07-07

## 2020-07-07 RX ORDER — PROMETHAZINE HYDROCHLORIDE 25 MG/1
12.5 TABLET ORAL EVERY 6 HOURS PRN
Status: DISCONTINUED | OUTPATIENT
Start: 2020-07-07 | End: 2020-07-08 | Stop reason: HOSPADM

## 2020-07-07 RX ORDER — LIDOCAINE HYDROCHLORIDE AND EPINEPHRINE 5; 5 MG/ML; UG/ML
INJECTION, SOLUTION INFILTRATION; PERINEURAL PRN
Status: DISCONTINUED | OUTPATIENT
Start: 2020-07-07 | End: 2020-07-07 | Stop reason: HOSPADM

## 2020-07-07 RX ORDER — MORPHINE SULFATE 2 MG/ML
2 INJECTION, SOLUTION INTRAMUSCULAR; INTRAVENOUS EVERY 4 HOURS PRN
Status: DISCONTINUED | OUTPATIENT
Start: 2020-07-07 | End: 2020-07-08 | Stop reason: HOSPADM

## 2020-07-07 RX ORDER — DIPHENHYDRAMINE HYDROCHLORIDE 50 MG/ML
6.25 INJECTION INTRAMUSCULAR; INTRAVENOUS
Status: DISCONTINUED | OUTPATIENT
Start: 2020-07-07 | End: 2020-07-07 | Stop reason: HOSPADM

## 2020-07-07 RX ORDER — DEXAMETHASONE SODIUM PHOSPHATE 4 MG/ML
4 INJECTION, SOLUTION INTRA-ARTICULAR; INTRALESIONAL; INTRAMUSCULAR; INTRAVENOUS; SOFT TISSUE EVERY 6 HOURS
Status: DISCONTINUED | OUTPATIENT
Start: 2020-07-07 | End: 2020-07-08 | Stop reason: HOSPADM

## 2020-07-07 RX ORDER — PSEUDOEPHEDRINE HYDROCHLORIDE 30 MG/1
30 TABLET ORAL EVERY 4 HOURS PRN
Status: DISCONTINUED | OUTPATIENT
Start: 2020-07-07 | End: 2020-07-08 | Stop reason: HOSPADM

## 2020-07-07 RX ORDER — ONDANSETRON 2 MG/ML
4 INJECTION INTRAMUSCULAR; INTRAVENOUS EVERY 30 MIN PRN
Status: DISCONTINUED | OUTPATIENT
Start: 2020-07-07 | End: 2020-07-07 | Stop reason: HOSPADM

## 2020-07-07 RX ORDER — ONDANSETRON 2 MG/ML
INJECTION INTRAMUSCULAR; INTRAVENOUS PRN
Status: DISCONTINUED | OUTPATIENT
Start: 2020-07-07 | End: 2020-07-07 | Stop reason: SDUPTHER

## 2020-07-07 RX ORDER — PROPOFOL 10 MG/ML
INJECTION, EMULSION INTRAVENOUS PRN
Status: DISCONTINUED | OUTPATIENT
Start: 2020-07-07 | End: 2020-07-07 | Stop reason: SDUPTHER

## 2020-07-07 RX ORDER — BACITRACIN ZINC AND POLYMYXIN B SULFATE 500; 1000 [USP'U]/G; [USP'U]/G
OINTMENT TOPICAL PRN
Status: DISCONTINUED | OUTPATIENT
Start: 2020-07-07 | End: 2020-07-07 | Stop reason: HOSPADM

## 2020-07-07 RX ORDER — IBUPROFEN 600 MG/1
600 TABLET ORAL EVERY 6 HOURS SCHEDULED
Status: DISCONTINUED | OUTPATIENT
Start: 2020-07-07 | End: 2020-07-08 | Stop reason: HOSPADM

## 2020-07-07 RX ORDER — FENTANYL CITRATE 50 UG/ML
INJECTION, SOLUTION INTRAMUSCULAR; INTRAVENOUS PRN
Status: DISCONTINUED | OUTPATIENT
Start: 2020-07-07 | End: 2020-07-07 | Stop reason: SDUPTHER

## 2020-07-07 RX ORDER — OXYCODONE HYDROCHLORIDE AND ACETAMINOPHEN 5; 325 MG/1; MG/1
1 TABLET ORAL PRN
Status: DISCONTINUED | OUTPATIENT
Start: 2020-07-07 | End: 2020-07-07 | Stop reason: HOSPADM

## 2020-07-07 RX ORDER — SODIUM CHLORIDE 0.9 % (FLUSH) 0.9 %
10 SYRINGE (ML) INJECTION PRN
Status: DISCONTINUED | OUTPATIENT
Start: 2020-07-07 | End: 2020-07-08 | Stop reason: HOSPADM

## 2020-07-07 RX ORDER — SODIUM CHLORIDE 0.9 % (FLUSH) 0.9 %
10 SYRINGE (ML) INJECTION PRN
Status: DISCONTINUED | OUTPATIENT
Start: 2020-07-07 | End: 2020-07-07 | Stop reason: HOSPADM

## 2020-07-07 RX ORDER — OXYCODONE HYDROCHLORIDE AND ACETAMINOPHEN 5; 325 MG/1; MG/1
2 TABLET ORAL PRN
Status: DISCONTINUED | OUTPATIENT
Start: 2020-07-07 | End: 2020-07-07 | Stop reason: HOSPADM

## 2020-07-07 RX ORDER — MEPERIDINE HYDROCHLORIDE 50 MG/ML
12.5 INJECTION INTRAMUSCULAR; INTRAVENOUS; SUBCUTANEOUS EVERY 5 MIN PRN
Status: DISCONTINUED | OUTPATIENT
Start: 2020-07-07 | End: 2020-07-07 | Stop reason: HOSPADM

## 2020-07-07 RX ORDER — MIDAZOLAM HYDROCHLORIDE 1 MG/ML
INJECTION INTRAMUSCULAR; INTRAVENOUS PRN
Status: DISCONTINUED | OUTPATIENT
Start: 2020-07-07 | End: 2020-07-07 | Stop reason: SDUPTHER

## 2020-07-07 RX ORDER — CHLORHEXIDINE GLUCONATE 0.12 MG/ML
15 RINSE ORAL 2 TIMES DAILY
Status: DISCONTINUED | OUTPATIENT
Start: 2020-07-07 | End: 2020-07-08 | Stop reason: HOSPADM

## 2020-07-07 RX ORDER — LABETALOL HYDROCHLORIDE 5 MG/ML
5 INJECTION, SOLUTION INTRAVENOUS
Status: DISCONTINUED | OUTPATIENT
Start: 2020-07-07 | End: 2020-07-07 | Stop reason: HOSPADM

## 2020-07-07 RX ORDER — MORPHINE SULFATE 2 MG/ML
2 INJECTION, SOLUTION INTRAMUSCULAR; INTRAVENOUS EVERY 5 MIN PRN
Status: DISCONTINUED | OUTPATIENT
Start: 2020-07-07 | End: 2020-07-07 | Stop reason: ALTCHOICE

## 2020-07-07 RX ORDER — MONTELUKAST SODIUM 10 MG/1
10 TABLET ORAL NIGHTLY
Status: DISCONTINUED | OUTPATIENT
Start: 2020-07-07 | End: 2020-07-08 | Stop reason: HOSPADM

## 2020-07-07 RX ORDER — OXYMETAZOLINE HYDROCHLORIDE 0.05 G/100ML
2 SPRAY NASAL 2 TIMES DAILY
Status: DISCONTINUED | OUTPATIENT
Start: 2020-07-07 | End: 2020-07-08 | Stop reason: HOSPADM

## 2020-07-07 RX ORDER — ONDANSETRON 2 MG/ML
4 INJECTION INTRAMUSCULAR; INTRAVENOUS EVERY 6 HOURS PRN
Status: DISCONTINUED | OUTPATIENT
Start: 2020-07-07 | End: 2020-07-08 | Stop reason: HOSPADM

## 2020-07-07 RX ORDER — SODIUM CHLORIDE 0.9 % (FLUSH) 0.9 %
10 SYRINGE (ML) INJECTION EVERY 12 HOURS SCHEDULED
Status: DISCONTINUED | OUTPATIENT
Start: 2020-07-07 | End: 2020-07-07 | Stop reason: HOSPADM

## 2020-07-07 RX ORDER — SODIUM CHLORIDE 0.9 % (FLUSH) 0.9 %
10 SYRINGE (ML) INJECTION EVERY 12 HOURS SCHEDULED
Status: DISCONTINUED | OUTPATIENT
Start: 2020-07-07 | End: 2020-07-08 | Stop reason: HOSPADM

## 2020-07-07 RX ORDER — MORPHINE SULFATE 2 MG/ML
INJECTION, SOLUTION INTRAMUSCULAR; INTRAVENOUS
Status: COMPLETED
Start: 2020-07-07 | End: 2020-07-07

## 2020-07-07 RX ORDER — ROCURONIUM BROMIDE 10 MG/ML
INJECTION, SOLUTION INTRAVENOUS PRN
Status: DISCONTINUED | OUTPATIENT
Start: 2020-07-07 | End: 2020-07-07 | Stop reason: SDUPTHER

## 2020-07-07 RX ORDER — SODIUM CHLORIDE, SODIUM LACTATE, POTASSIUM CHLORIDE, CALCIUM CHLORIDE 600; 310; 30; 20 MG/100ML; MG/100ML; MG/100ML; MG/100ML
INJECTION, SOLUTION INTRAVENOUS CONTINUOUS PRN
Status: DISCONTINUED | OUTPATIENT
Start: 2020-07-07 | End: 2020-07-07 | Stop reason: SDUPTHER

## 2020-07-07 RX ORDER — LIDOCAINE HYDROCHLORIDE 10 MG/ML
INJECTION, SOLUTION INFILTRATION; PERINEURAL PRN
Status: DISCONTINUED | OUTPATIENT
Start: 2020-07-07 | End: 2020-07-07 | Stop reason: SDUPTHER

## 2020-07-07 RX ORDER — ACETAMINOPHEN 500 MG
1000 TABLET ORAL EVERY 6 HOURS SCHEDULED
Status: DISCONTINUED | OUTPATIENT
Start: 2020-07-07 | End: 2020-07-08 | Stop reason: HOSPADM

## 2020-07-07 RX ORDER — CHLORHEXIDINE GLUCONATE 0.12 MG/ML
15 RINSE ORAL 2 TIMES DAILY
Status: DISCONTINUED | OUTPATIENT
Start: 2020-07-07 | End: 2020-07-07 | Stop reason: HOSPADM

## 2020-07-07 RX ORDER — LIDOCAINE HYDROCHLORIDE 10 MG/ML
0.3 INJECTION, SOLUTION EPIDURAL; INFILTRATION; INTRACAUDAL; PERINEURAL
Status: DISCONTINUED | OUTPATIENT
Start: 2020-07-07 | End: 2020-07-07 | Stop reason: HOSPADM

## 2020-07-07 RX ORDER — DEXAMETHASONE SODIUM PHOSPHATE 10 MG/ML
INJECTION INTRAMUSCULAR; INTRAVENOUS PRN
Status: DISCONTINUED | OUTPATIENT
Start: 2020-07-07 | End: 2020-07-07 | Stop reason: SDUPTHER

## 2020-07-07 RX ORDER — SODIUM CHLORIDE 9 MG/ML
INJECTION, SOLUTION INTRAVENOUS CONTINUOUS
Status: DISCONTINUED | OUTPATIENT
Start: 2020-07-07 | End: 2020-07-08 | Stop reason: HOSPADM

## 2020-07-07 RX ADMIN — MORPHINE SULFATE 2 MG: 2 INJECTION, SOLUTION INTRAMUSCULAR; INTRAVENOUS at 21:15

## 2020-07-07 RX ADMIN — MORPHINE SULFATE 2 MG: 2 INJECTION, SOLUTION INTRAMUSCULAR; INTRAVENOUS at 17:27

## 2020-07-07 RX ADMIN — MIDAZOLAM HYDROCHLORIDE 2 MG: 2 INJECTION, SOLUTION INTRAMUSCULAR; INTRAVENOUS at 07:36

## 2020-07-07 RX ADMIN — MORPHINE SULFATE 2 MG: 2 INJECTION, SOLUTION INTRAMUSCULAR; INTRAVENOUS at 13:37

## 2020-07-07 RX ADMIN — ROCURONIUM BROMIDE 25 MG: 10 SOLUTION INTRAVENOUS at 11:13

## 2020-07-07 RX ADMIN — MORPHINE SULFATE 2 MG: 2 INJECTION, SOLUTION INTRAMUSCULAR; INTRAVENOUS at 14:04

## 2020-07-07 RX ADMIN — PROPOFOL 170 MG: 10 INJECTION, EMULSION INTRAVENOUS at 07:36

## 2020-07-07 RX ADMIN — DEXAMETHASONE SODIUM PHOSPHATE 8 MG: 10 INJECTION INTRAMUSCULAR; INTRAVENOUS at 07:36

## 2020-07-07 RX ADMIN — DEXAMETHASONE SODIUM PHOSPHATE 4 MG: 4 INJECTION, SOLUTION INTRAMUSCULAR; INTRAVENOUS at 18:33

## 2020-07-07 RX ADMIN — LIDOCAINE HYDROCHLORIDE 20 MG: 10 INJECTION, SOLUTION INFILTRATION; PERINEURAL at 07:36

## 2020-07-07 RX ADMIN — OXYCODONE 5 MG: 5 TABLET ORAL at 23:51

## 2020-07-07 RX ADMIN — MORPHINE SULFATE 2 MG: 2 INJECTION, SOLUTION INTRAMUSCULAR; INTRAVENOUS at 14:42

## 2020-07-07 RX ADMIN — SODIUM CHLORIDE, POTASSIUM CHLORIDE, SODIUM LACTATE AND CALCIUM CHLORIDE: 600; 310; 30; 20 INJECTION, SOLUTION INTRAVENOUS at 09:19

## 2020-07-07 RX ADMIN — HYDROMORPHONE HYDROCHLORIDE 0.5 MG: 1 INJECTION, SOLUTION INTRAMUSCULAR; INTRAVENOUS; SUBCUTANEOUS at 13:04

## 2020-07-07 RX ADMIN — SODIUM CHLORIDE, POTASSIUM CHLORIDE, SODIUM LACTATE AND CALCIUM CHLORIDE: 600; 310; 30; 20 INJECTION, SOLUTION INTRAVENOUS at 07:36

## 2020-07-07 RX ADMIN — ONDANSETRON 4 MG: 2 INJECTION INTRAMUSCULAR; INTRAVENOUS at 17:27

## 2020-07-07 RX ADMIN — HYDROMORPHONE HYDROCHLORIDE 0.5 MG: 1 INJECTION, SOLUTION INTRAMUSCULAR; INTRAVENOUS; SUBCUTANEOUS at 13:13

## 2020-07-07 RX ADMIN — ONDANSETRON 4 MG: 2 INJECTION INTRAMUSCULAR; INTRAVENOUS at 07:36

## 2020-07-07 RX ADMIN — PSEUDOEPHEDRINE HCL 30 MG: 30 TABLET, FILM COATED ORAL at 21:14

## 2020-07-07 RX ADMIN — SODIUM CHLORIDE: 9 INJECTION, SOLUTION INTRAVENOUS at 18:32

## 2020-07-07 RX ADMIN — CEFAZOLIN SODIUM 2 G: 10 INJECTION, POWDER, FOR SOLUTION INTRAVENOUS at 18:33

## 2020-07-07 RX ADMIN — CEFAZOLIN SODIUM 2 G: 10 INJECTION, POWDER, FOR SOLUTION INTRAVENOUS at 07:36

## 2020-07-07 RX ADMIN — FENTANYL CITRATE 50 MCG: 50 INJECTION INTRAMUSCULAR; INTRAVENOUS at 08:07

## 2020-07-07 RX ADMIN — SUGAMMADEX 150 MG: 100 INJECTION, SOLUTION INTRAVENOUS at 12:25

## 2020-07-07 RX ADMIN — ROCURONIUM BROMIDE 25 MG: 10 SOLUTION INTRAVENOUS at 08:08

## 2020-07-07 RX ADMIN — PROMETHAZINE HYDROCHLORIDE 12.5 MG: 25 TABLET ORAL at 21:14

## 2020-07-07 RX ADMIN — ROCURONIUM BROMIDE 50 MG: 10 SOLUTION INTRAVENOUS at 07:36

## 2020-07-07 RX ADMIN — FENTANYL CITRATE 50 MCG: 50 INJECTION INTRAMUSCULAR; INTRAVENOUS at 08:46

## 2020-07-07 RX ADMIN — CEFAZOLIN SODIUM 1 G: 10 INJECTION, POWDER, FOR SOLUTION INTRAVENOUS at 11:36

## 2020-07-07 RX ADMIN — HYDROMORPHONE HYDROCHLORIDE 0.5 MG: 1 INJECTION, SOLUTION INTRAMUSCULAR; INTRAVENOUS; SUBCUTANEOUS at 13:22

## 2020-07-07 RX ADMIN — FENTANYL CITRATE 100 MCG: 50 INJECTION INTRAMUSCULAR; INTRAVENOUS at 07:36

## 2020-07-07 RX ADMIN — Medication 10 ML: at 21:57

## 2020-07-07 RX ADMIN — SODIUM CHLORIDE, POTASSIUM CHLORIDE, SODIUM LACTATE AND CALCIUM CHLORIDE: 600; 310; 30; 20 INJECTION, SOLUTION INTRAVENOUS at 09:28

## 2020-07-07 ASSESSMENT — PULMONARY FUNCTION TESTS
PIF_VALUE: 3
PIF_VALUE: 17
PIF_VALUE: 19
PIF_VALUE: 18
PIF_VALUE: 18
PIF_VALUE: 3
PIF_VALUE: 18
PIF_VALUE: 18
PIF_VALUE: 19
PIF_VALUE: 19
PIF_VALUE: 18
PIF_VALUE: 18
PIF_VALUE: 19
PIF_VALUE: 18
PIF_VALUE: 19
PIF_VALUE: 17
PIF_VALUE: 18
PIF_VALUE: 3
PIF_VALUE: 19
PIF_VALUE: 19
PIF_VALUE: 18
PIF_VALUE: 17
PIF_VALUE: 18
PIF_VALUE: 0
PIF_VALUE: 18
PIF_VALUE: 18
PIF_VALUE: 19
PIF_VALUE: 18
PIF_VALUE: 17
PIF_VALUE: 10
PIF_VALUE: 20
PIF_VALUE: 18
PIF_VALUE: 18
PIF_VALUE: 17
PIF_VALUE: 1
PIF_VALUE: 18
PIF_VALUE: 19
PIF_VALUE: 18
PIF_VALUE: 19
PIF_VALUE: 4
PIF_VALUE: 17
PIF_VALUE: 18
PIF_VALUE: 17
PIF_VALUE: 19
PIF_VALUE: 18
PIF_VALUE: 19
PIF_VALUE: 18
PIF_VALUE: 19
PIF_VALUE: 18
PIF_VALUE: 1
PIF_VALUE: 19
PIF_VALUE: 17
PIF_VALUE: 18
PIF_VALUE: 18
PIF_VALUE: 17
PIF_VALUE: 18
PIF_VALUE: 17
PIF_VALUE: 19
PIF_VALUE: 18
PIF_VALUE: 17
PIF_VALUE: 19
PIF_VALUE: 18
PIF_VALUE: 2
PIF_VALUE: 19
PIF_VALUE: 18
PIF_VALUE: 19
PIF_VALUE: 19
PIF_VALUE: 18
PIF_VALUE: 19
PIF_VALUE: 18
PIF_VALUE: 17
PIF_VALUE: 18
PIF_VALUE: 18
PIF_VALUE: 19
PIF_VALUE: 19
PIF_VALUE: 17
PIF_VALUE: 19
PIF_VALUE: 18
PIF_VALUE: 19
PIF_VALUE: 1
PIF_VALUE: 19
PIF_VALUE: 17
PIF_VALUE: 3
PIF_VALUE: 18
PIF_VALUE: 19
PIF_VALUE: 18
PIF_VALUE: 19
PIF_VALUE: 20
PIF_VALUE: 19
PIF_VALUE: 17
PIF_VALUE: 18
PIF_VALUE: 19
PIF_VALUE: 18
PIF_VALUE: 19
PIF_VALUE: 2
PIF_VALUE: 19
PIF_VALUE: 17
PIF_VALUE: 3
PIF_VALUE: 18
PIF_VALUE: 19
PIF_VALUE: 18
PIF_VALUE: 18
PIF_VALUE: 19
PIF_VALUE: 18
PIF_VALUE: 18
PIF_VALUE: 19
PIF_VALUE: 18
PIF_VALUE: 18
PIF_VALUE: 19
PIF_VALUE: 3
PIF_VALUE: 19
PIF_VALUE: 18
PIF_VALUE: 19
PIF_VALUE: 18
PIF_VALUE: 0
PIF_VALUE: 18
PIF_VALUE: 19
PIF_VALUE: 18
PIF_VALUE: 4
PIF_VALUE: 19
PIF_VALUE: 18
PIF_VALUE: 19
PIF_VALUE: 18
PIF_VALUE: 19
PIF_VALUE: 18
PIF_VALUE: 19
PIF_VALUE: 18
PIF_VALUE: 17
PIF_VALUE: 17
PIF_VALUE: 18
PIF_VALUE: 16
PIF_VALUE: 18
PIF_VALUE: 10
PIF_VALUE: 3
PIF_VALUE: 4
PIF_VALUE: 19
PIF_VALUE: 18
PIF_VALUE: 17
PIF_VALUE: 19
PIF_VALUE: 18
PIF_VALUE: 18
PIF_VALUE: 2
PIF_VALUE: 17
PIF_VALUE: 19
PIF_VALUE: 18
PIF_VALUE: 1
PIF_VALUE: 18
PIF_VALUE: 3
PIF_VALUE: 19
PIF_VALUE: 18
PIF_VALUE: 18
PIF_VALUE: 1
PIF_VALUE: 18
PIF_VALUE: 19
PIF_VALUE: 19
PIF_VALUE: 18
PIF_VALUE: 19
PIF_VALUE: 1
PIF_VALUE: 18
PIF_VALUE: 19
PIF_VALUE: 18
PIF_VALUE: 19
PIF_VALUE: 15
PIF_VALUE: 18
PIF_VALUE: 19
PIF_VALUE: 18
PIF_VALUE: 19
PIF_VALUE: 18
PIF_VALUE: 19
PIF_VALUE: 18
PIF_VALUE: 19
PIF_VALUE: 18
PIF_VALUE: 18
PIF_VALUE: 19
PIF_VALUE: 18
PIF_VALUE: 17
PIF_VALUE: 19
PIF_VALUE: 4
PIF_VALUE: 18
PIF_VALUE: 18
PIF_VALUE: 20
PIF_VALUE: 19
PIF_VALUE: 19
PIF_VALUE: 18
PIF_VALUE: 3
PIF_VALUE: 19
PIF_VALUE: 18
PIF_VALUE: 19
PIF_VALUE: 17
PIF_VALUE: 18
PIF_VALUE: 18
PIF_VALUE: 17
PIF_VALUE: 19
PIF_VALUE: 19
PIF_VALUE: 2
PIF_VALUE: 18
PIF_VALUE: 19
PIF_VALUE: 18
PIF_VALUE: 17
PIF_VALUE: 18
PIF_VALUE: 19
PIF_VALUE: 17
PIF_VALUE: 19
PIF_VALUE: 18
PIF_VALUE: 19
PIF_VALUE: 18
PIF_VALUE: 0
PIF_VALUE: 18
PIF_VALUE: 18
PIF_VALUE: 19
PIF_VALUE: 18
PIF_VALUE: 19
PIF_VALUE: 18
PIF_VALUE: 18
PIF_VALUE: 0
PIF_VALUE: 19
PIF_VALUE: 0
PIF_VALUE: 18
PIF_VALUE: 18
PIF_VALUE: 4
PIF_VALUE: 19
PIF_VALUE: 17
PIF_VALUE: 18
PIF_VALUE: 19
PIF_VALUE: 18
PIF_VALUE: 20
PIF_VALUE: 19
PIF_VALUE: 18
PIF_VALUE: 19
PIF_VALUE: 4
PIF_VALUE: 18
PIF_VALUE: 17
PIF_VALUE: 19
PIF_VALUE: 18
PIF_VALUE: 19
PIF_VALUE: 18
PIF_VALUE: 19
PIF_VALUE: 4
PIF_VALUE: 19
PIF_VALUE: 19
PIF_VALUE: 18
PIF_VALUE: 18
PIF_VALUE: 0
PIF_VALUE: 18
PIF_VALUE: 19
PIF_VALUE: 18
PIF_VALUE: 18
PIF_VALUE: 19
PIF_VALUE: 19
PIF_VALUE: 18
PIF_VALUE: 19
PIF_VALUE: 19
PIF_VALUE: 18
PIF_VALUE: 7

## 2020-07-07 ASSESSMENT — PAIN SCALES - GENERAL
PAINLEVEL_OUTOF10: 7
PAINLEVEL_OUTOF10: 6
PAINLEVEL_OUTOF10: 6
PAINLEVEL_OUTOF10: 4
PAINLEVEL_OUTOF10: 5
PAINLEVEL_OUTOF10: 4
PAINLEVEL_OUTOF10: 5
PAINLEVEL_OUTOF10: 4
PAINLEVEL_OUTOF10: 4
PAINLEVEL_OUTOF10: 5
PAINLEVEL_OUTOF10: 0

## 2020-07-07 ASSESSMENT — PAIN DESCRIPTION - PAIN TYPE
TYPE: SURGICAL PAIN

## 2020-07-07 ASSESSMENT — PAIN DESCRIPTION - LOCATION
LOCATION: JAW;MOUTH
LOCATION: MOUTH;JAW
LOCATION: JAW;MOUTH

## 2020-07-07 NOTE — H&P
The H&P was reviewed, the patient was examined, and no change has occurred in the patient's condition since the H&P was completed.     Electronically signed by Jean-Paul Jordan DMD on 7/7/2020 at 7:12 AM

## 2020-07-07 NOTE — OP NOTE
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    Post-Op Diagnosis: Same       Procedure(s):  LEFORT I advancement and cant correction  BILATERAL SAGITTAL SPLIT OSTEOTOMY SETBACK  GENIOPLASTY REDUCTION AND DOWN GRAFT WITH BONE GRAFT  CPT CODE - 76922, 98891, 68912    Surgeon(s):  Harper Aggarwal DMD    Assistant:   Surgical Assistant: Jim Quintero    Anesthesia: General    Estimated Blood Loss (mL): 300    Fluids: 1580QC    Complications: None    Specimens:   none    Implants:  Emmanuel orthognathic lefort plates  Emmanuel BSSO screws  Melrose chin plates      Drains: none    Findings: mental and inferior alveolar nerves intact bilaterally    Detailed Description of Procedure: The patient was placed in supine position on the operating table. Following induction of general anesthesia, a nasal endotracheal tube was successfully placed and secured. Patient was prepped with Betadine solution and draped in sterile fashion. 10 cc of 0.5% lidocaine was infiltrated at surgical sites. K-wire was placed at nasion with preoperative measurements to the bracket of the lateral incisor recorded     Enamoplasty was required and completed with a round bur. Bilateral maxillary vestibular incisions were prepared. Dissection was carried out in a subperiosteal fashion, around the piriform rim and over the zygomatic buttress and posteriorly to the Pterygoid plates with care to remain in subperiosteal plane. Dissection of the nasal mucosa was then completed on the anterior nasal spine and piriform rim, then elevating the nasal mucosa off of the maxilla and palatal bones.      Osteotomies of the lateral maxillary walls were then completed with a sonopet saw under copious saline irrigation using a marking guide. Osteotomies of the nasal septum and lateral walls were then completed with double and single guarded osteotomes respectively. Pterygomaxillary disjunction was completed with a curved osteotome directed inferiorly and anteriorly at the pterygomaxillary junction behind the maxillary tuberosities. The final splint was then secured to the maxilla. Mueller spreaders and a bone hook were then applied to down fracture the maxilla, which was then mobilized in an atraumatic manner with the Rowes mobilizers. She was placed into intermaxlliary fixation with the intermediate splint in place. Bony interferences were removed with a rongeurs and pineapple bur under copious irrigation. The descending palatine vessels were noted to be intact. AND and bilateral inferior pyriform apertures were reduced with a pineapple bur. Measurements were then taken from the previously placed K wire to ensure appropriate left side impaction was achieved to correct the cant. Maxilla was then secured with L-shaped mini plates on the buttresses bilaterally with monocortical bone screws and advancement plates medially along the piriform rims. Intermaxillary fixation was then removed and the mandible was noted to close into the splint passively with the condylar heads in CR. Attention was then directed to the mandible where vestibular incisions were prepared with Bovie deep to periosteum. Subperiosteal dissection over lateral border of the mandible from angle first molar region was exposed. Dissection was also completed along the medial border of the mandible superior to the LOIS. A nerve hook was placed in the mandibular foramen to protect neurovascular bundle. A sonopet saw was used to create bilateral monocortical osteotomies to allow for a sagittal split. The BSSO was completed with gradual application of finger and fiber handle osteotomes and a Smith separator.  Following completion of the sagittal splits of the mandible, the inferior alveolar nerves were noted to remain preserved within the distal segments of the mandible. The final splint and intermaxillary fixation were then applied. The proximal segments were ensured to be fully seated, 4mm of bone were removed from the left proximal segment, then fixated to the distal segments three superior border positional screws on each side. She was then taken out of maxillomandibular fixation and appropriate post-op occlusion verified. Surgical sites were irrigated with copious amounts of saline. The sagittal split incisions were closed with 3-0 chromic in a running configuration. A 3-0 PDS was used for an alar cinch and secured. 3-0 chromic was then used for mucosa in the maxilla. Next, 6 ml of 0.5% marcaine with epinephrine was injected into the site of the Genioplasty. After adequate time for local anesthesia was given a genioplasty incision was made and the mentalis was stripped and the mental nerves on both sides were identified and isolated. The Genioplasty marking guide was then used to make the initial cut. The chin segment was then mobilized. The genioplasty fixation guide was then placed and secured. The segment was then grafted with native bone and corticocancellous bone and then fixated with two L shaped plates with 8 mm screws. The incision was closed using 2.0 vicryl for the mentalis muscle approximation and then 3.0 chromic for the sulcular incision. Another 10 ml of 0.5%marcaine was injected into the lefort and BSSO sites. The throat pack was removed, orogastric tube passed and the stomach decompressed. K wire was removed from nasion. At the conclusion of the procedure the patient emerged from anesthesia without complication. She was taken to the PACU postoperatively.         Electronically signed by Kylie Nolan DMD on 7/7/2020 at 7:12 AM

## 2020-07-07 NOTE — PROGRESS NOTES
Report to [facility]. 4 Eyes Skin Assessment     The patient is being assess for   Post-Op Surgical    I agree that 2 RN's have performed a thorough Head to Toe Skin Assessment on the patient. ALL assessment sites listed below have been assessed. Areas assessed by both nurses:   []   Head, Face, and Ears   [x]   Shoulders, Back, and Chest, Abdomen  [x]   Arms, Elbows, and Hands   [x]   Coccyx, Sacrum, and Ischium  []   Legs, Feet, and Heels        No skin issues. **SHARE this note so that the co-signing nurse is able to place an eSignature**    Co-signer eSignature: Electronically signed by Tamy Pineda RN on 7/7/20 at 3:01 PM EDT    Does the Patient have Skin Breakdown?   No          Chapin Prevention initiated:  No   Wound Care Orders initiated:  No      [facility] nurse consulted for Pressure Injury (Stage 3,4, Unstageable, DTI, NWPT, Complex wounds)and New or Established Ostomies:  NA      Primary Nurse eSignature: Electronically signed by Marc Romeo RN on 7/7/20 at 10:45 PM EDT
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
## 2020-07-07 NOTE — ANESTHESIA POSTPROCEDURE EVALUATION
Department of Anesthesiology  Postprocedure Note    Patient: Zainab Elliott  MRN: [id number]  YOB: 1980  Date of evaluation: 7/7/2020  Time:  4:08 PM     Procedure Summary     Date:  07/07/20 Room / Location:  [facility]    Anesthesia Start:  5142 Anesthesia Stop:  1532    Procedure:  Conor Height SPLIT, BILATERAL MANDIBULAR BONE GRAFTS, GENIOPLASTY REDUCTION WITH SONOPET           JACKLYN  CPT CODE - 51440, 04514, 45688 (N/A Mouth) Diagnosis:       Maxillary hypoplasia      Other jaw asymmetry      (MAXILLARY HYPOPLASIA; OTHER JAW ASYMMETRY)    Surgeon:  Luwanna Nissen, DMD Responsible Provider:  Kiana Pino MD    Anesthesia Type:  general ASA Status:  1          Anesthesia Type: general    Nacho Phase I: Nacho Score: 9    Nacho Phase II:      Last vitals: Reviewed and per EMR flowsheets.        Anesthesia Post Evaluation    Comments: Postoperative Anesthesia Note    Name:    Zainab Elliott  MRN:      [id number]    Patient Vitals in the past 12 hrs:  07/07/20 1530, BP:104/63, Temp:97.5 °F (36.4 °C), Temp src:Axillary, Pulse:85, Resp:16, SpO2:97 %  07/07/20 1528, SpO2:100 %  07/07/20 1440, BP:(!) 116/53, Pulse:85, SpO2:99 %  07/07/20 1425, BP:(!) 111/57, Pulse:92, SpO2:100 %  07/07/20 1420, Pulse:88, SpO2:100 %  07/07/20 1415, Pulse:90, SpO2:100 %  07/07/20 1410, BP:(!) 110/50, Pulse:83, SpO2:99 %  07/07/20 1405, Pulse:87, SpO2:99 %  07/07/20 1355, BP:(!) 106/52, Pulse:89, SpO2:100 %  07/07/20 1340, BP:(!) 107/56, Temp:97.1 °F (36.2 °C), Temp src:Temporal, Pulse:82, SpO2:100 %  07/07/20 1335, BP:(!) 111/51, Pulse:80, SpO2:99 %  07/07/20 1330, BP:(!) 115/55, Pulse:78, SpO2:100 %  07/07/20 1325, BP:(!) 110/48, Pulse:84, SpO2:99 %  07/07/20 1320, BP:(!) 107/55, Pulse:84, SpO2:100 %  07/07/20 1315, BP:(!) 106/53, Pulse:91, SpO2:100 %  07/07/20 1310, BP:(!) 105/47, Pulse:86, SpO2:100 %  07/07/20 1305, BP:(!) 103/48, Pulse:86, SpO2:100 %  07/07/20 1300, BP:(!) 96/43, Pulse:87, SpO2:99 %  07/07/20 1255, BP:(!) 105/54, Pulse:82, SpO2:97 %  07/07/20 1250, BP:(!) 79/32, Temp:97.3 °F (36.3 °C), Temp src:Temporal, Pulse:81, SpO2:98 %  07/07/20 0630, BP:130/71, Temp:98.3 °F (36.8 °C), Temp src:Temporal, Pulse:100, Resp:16, SpO2:100 %, Height:5' 4\" (1.626 m), Weight:162 lb (73.5 kg)     LABS:    CBC  Lab Results       Component                Value               Date/Time                  WBC                      7.8                 07/07/2020 06:50 AM        HGB                      12.3                07/07/2020 06:50 AM        HCT                      37.4                07/07/2020 06:50 AM        PLT                      312                 07/07/2020 06:50 AM   RENAL  Lab Results       Component                Value               Date/Time                  NA                       140                 11/11/2019 08:03 AM        K                        4.3                 11/11/2019 08:03 AM        CL                       100                 11/11/2019 08:03 AM        CO2                      27                  11/11/2019 08:03 AM        BUN                      15                  11/11/2019 08:03 AM        CREATININE               0.8                 11/11/2019 08:03 AM        GLUCOSE                  87                  11/11/2019 08:03 AM   COAGS  No results found for: PROTIME, INR, APTT    Intake & Output:  In: 2300 (I.V.:2300)  Out: 650 (Urine:350)    Nausea & Vomiting:  No    Level of Consciousness:  Awake    Pain Assessment:  Adequate analgesia    Anesthesia Complications:  No apparent anesthetic complications    SUMMARY      Vital signs stable  OK to discharge from Stage I post anesthesia care.   Care transferred from Anesthesiology department on discharge from perioperative area

## 2020-07-08 VITALS
BODY MASS INDEX: 27.66 KG/M2 | TEMPERATURE: 98 F | DIASTOLIC BLOOD PRESSURE: 60 MMHG | WEIGHT: 162 LBS | OXYGEN SATURATION: 100 % | SYSTOLIC BLOOD PRESSURE: 100 MMHG | HEART RATE: 84 BPM | HEIGHT: 64 IN | RESPIRATION RATE: 20 BRPM

## 2020-07-08 LAB
HCT VFR BLD CALC: 31.6 % (ref 36–48)
HEMOGLOBIN: 10.4 G/DL (ref 12–16)

## 2020-07-08 PROCEDURE — 85018 HEMOGLOBIN: CPT

## 2020-07-08 PROCEDURE — 2580000003 HC RX 258: Performed by: DENTIST

## 2020-07-08 PROCEDURE — 6370000000 HC RX 637 (ALT 250 FOR IP)

## 2020-07-08 PROCEDURE — 6360000002 HC RX W HCPCS: Performed by: DENTIST

## 2020-07-08 PROCEDURE — 36415 COLL VENOUS BLD VENIPUNCTURE: CPT

## 2020-07-08 PROCEDURE — 6370000000 HC RX 637 (ALT 250 FOR IP): Performed by: DENTIST

## 2020-07-08 PROCEDURE — 85014 HEMATOCRIT: CPT

## 2020-07-08 RX ADMIN — IBUPROFEN 600 MG: 600 TABLET, FILM COATED ORAL at 04:01

## 2020-07-08 RX ADMIN — IBUPROFEN 600 MG: 600 TABLET, FILM COATED ORAL at 07:54

## 2020-07-08 RX ADMIN — ONDANSETRON 4 MG: 2 INJECTION INTRAMUSCULAR; INTRAVENOUS at 00:38

## 2020-07-08 RX ADMIN — DEXAMETHASONE SODIUM PHOSPHATE 4 MG: 4 INJECTION, SOLUTION INTRAMUSCULAR; INTRAVENOUS at 00:30

## 2020-07-08 RX ADMIN — MORPHINE SULFATE 2 MG: 2 INJECTION, SOLUTION INTRAMUSCULAR; INTRAVENOUS at 01:27

## 2020-07-08 RX ADMIN — DEXAMETHASONE SODIUM PHOSPHATE 4 MG: 4 INJECTION, SOLUTION INTRAMUSCULAR; INTRAVENOUS at 06:38

## 2020-07-08 RX ADMIN — DEXAMETHASONE SODIUM PHOSPHATE 4 MG: 4 INJECTION, SOLUTION INTRAMUSCULAR; INTRAVENOUS at 11:31

## 2020-07-08 RX ADMIN — OXYCODONE 5 MG: 5 TABLET ORAL at 07:54

## 2020-07-08 RX ADMIN — OXYMETAZOLINE HCL 2 SPRAY: 0.05 SPRAY NASAL at 08:56

## 2020-07-08 RX ADMIN — ONDANSETRON 4 MG: 2 INJECTION INTRAMUSCULAR; INTRAVENOUS at 06:50

## 2020-07-08 RX ADMIN — PSEUDOEPHEDRINE HCL 30 MG: 30 TABLET, FILM COATED ORAL at 03:58

## 2020-07-08 RX ADMIN — ACETAMINOPHEN 1000 MG: 500 TABLET ORAL at 11:31

## 2020-07-08 RX ADMIN — CEFAZOLIN SODIUM 2 G: 10 INJECTION, POWDER, FOR SOLUTION INTRAVENOUS at 02:46

## 2020-07-08 RX ADMIN — Medication 15 ML: at 08:58

## 2020-07-08 RX ADMIN — Medication: at 03:59

## 2020-07-08 RX ADMIN — ACETAMINOPHEN 1000 MG: 500 TABLET ORAL at 06:38

## 2020-07-08 ASSESSMENT — PAIN SCALES - GENERAL
PAINLEVEL_OUTOF10: 5
PAINLEVEL_OUTOF10: 7
PAINLEVEL_OUTOF10: 4
PAINLEVEL_OUTOF10: 5

## 2020-07-08 ASSESSMENT — PAIN DESCRIPTION - PAIN TYPE: TYPE: SURGICAL PAIN

## 2020-07-08 ASSESSMENT — PAIN DESCRIPTION - LOCATION: LOCATION: JAW;MOUTH

## 2020-07-08 NOTE — PROGRESS NOTES
Pt given discharge instructions. Dr. Shannon Faith office gave pt prescriptions and postop instructions already. Laurence

## 2020-07-08 NOTE — CARE COORDINATION
Met with patient and  at bedside. IPTA. Has payor source and PCP as well as oral surgeon. Denies further needs.     Jeffrey Briscoe RN

## 2020-07-08 NOTE — PROGRESS NOTES
PT stood,ambulated to bedside commode, no dizziness pt tolerated well.  Bed locked lowest position, non skid socks in place, call light in reach

## 2020-07-08 NOTE — DISCHARGE SUMMARY
Discharge Summary    Date:7/8/2020        Patient Ashish Ashton     YOB: 1980     Age:40 y.o. Admit Date:7/7/2020   Admission Condition:good   Discharged Condition:good  Discharge Date: 07/08/20     Discharge Diagnoses   Active Problems:    Skeletal malocclusion  Resolved Problems:    * No resolved hospital problems. [facility] Stay   Narrative of Hospital Course:  No issues post-op. Stable overnight with no acute events. Voiding, ambulating, tolerating PO and pain well controlled on PO meds. N/V no controlled. Consultants:   None    Time Spent on Discharge:  30 minutes were spent in patient examination, evaluation, counseling as well as medication reconciliation, prescriptions for required medications, discharge plan and follow up. Surgeries/Procedures Performed:  Procedure(s):  LEFORT, SAGITTAL SPLIT, BILATERAL MANDIBULAR BONE GRAFTS, GENIOPLASTY REDUCTION    Treatments:   IV hydration    Exam:  Bilateral facial edema c/w surgery  Head wrap and chin wrap in place  Sutures intact  Eomi, perrla, hearing intact, nares patent. Trachea midline      Radiology Last 7 Days:  No results found. Discharge Plan   Disposition: Home    Provider Follow-Up:   Oniel Bourgeois, APRN - CNP  [id number]  [address] [facility] [phone]    In 3 days  Follow up       Hospital/Incidental Findings Requiring Follow-Up:  none    Patient Instructions   Diet: clear liquids    Activity: activity as tolerated    Other Instructions:   Sinus precautions.      Discharge Medications         Medication List      CONTINUE taking these medications    Aimovig 70 MG/ML Soaj  Generic drug:  Erenumab-aooe  Inject 70 mg into the skin every 30 days     MAGNESIUM PO     montelukast 10 MG tablet  Commonly known as:  SINGULAIR  TAKE ONE TABLET BY MOUTH DAILY AS NEEDED FOR ALLERGIES     ondansetron 4 MG tablet  Commonly known as: Zofran  Take 1 tablet by mouth every 8 hours as needed for Nausea or Vomiting     promethazine 12.5 MG tablet  Commonly known as:  PHENERGAN  Take 1 tablet by mouth every 8 hours as needed for Nausea     RIBOFLAVIN PO     therapeutic multivitamin-minerals tablet     triamcinolone 0.1 % cream  Commonly known as:  KENALOG  Apply topically 2 times daily.             Electronically signed by Sam Demarco DMD on 7/8/20 at 7:43 AM EDT

## 2020-07-08 NOTE — PROGRESS NOTES
Pt alert and orientated. Call light within reach. Dr. Beckman Dears into see pt. Will continue to monitor. Laurence

## 2020-07-30 ENCOUNTER — TELEPHONE (OUTPATIENT)
Dept: ADMINISTRATIVE | Age: 40
End: 2020-07-30

## 2020-07-30 NOTE — TELEPHONE ENCOUNTER
Submitted PA for Aimovig 70MG/ML auto-injectors, Key: [id number]. Medication is APPROVED. Will scan letter once received. Please notify patient. Thank you.

## 2020-08-03 ENCOUNTER — VIRTUAL VISIT (OUTPATIENT)
Dept: PSYCHOLOGY | Age: 40
End: 2020-08-03
Payer: COMMERCIAL

## 2020-08-03 PROCEDURE — 90832 PSYTX W PT 30 MINUTES: CPT | Performed by: PSYCHOLOGIST

## 2020-08-03 NOTE — PATIENT INSTRUCTIONS
Deep Breathing     What Is Deep Breathing? Deep breathing involves using your diaphragm muscle to help bring about a state of physiological relaxation. The diaphragm is a large muscle that rests across the bottom of your rib cage. When you inhale, the diaphragm muscle drops, opening up space so air can come in. When watching someone do this it looks like your stomach is filling with air. This type of breathing helps activate the part of your nervous system that controls relaxation. It can lead to decreased heart rate, blood pressure, decreased muscle tension, and overall feelings of relaxation. Why Be Concerned With How Im Breathing?  To increase your awareness of the role that breathing plays in increased physical tension and in contributing to increasing your bodys stress response.  To lower your level of stress-related arousal and tension.  To give you a method of taking calm, relaxing breaths to break the cycle of increasing arousal during stressful situations. What Is the Best Way To Use Deep Breathing Exercises?  Use deep breathing frequently.  Take deep breaths at the first signs of stress, anxiety, physical tension, or other symptoms.  Schedule time for relaxation. My scheduled time for deep breathing will be bedtime. Relaxation:  Diaphragmatic Breathing             ______________________________________________________________________________    1. Sit in a comfortable position  2. Place one hand on your stomach and the other on your chest  3. Try to breathe so that only your stomach rises and falls    As you inhale, concentrate on your chest remaining relatively still while your stomach rises. It may be helpful for you to imagine that your pants are too big and you need to push your stomach out to hold them up. When exhaling, allow your stomach to fall in and the air to fully escape. Inhale slowly.   You may choose to hold the air in for about a second. Exhale slowly. Dont push the air out, but just let the natural pressure of your body slowly move it out. It is normal for this healthy method of breathing to feel a little awkward at first.  With practice, it will feel more natural.    4.  Take some deep breaths, concentrating on only moving your stomach. Hold each breath for 2 to 3 seconds before exhaling. 5.   Get your mind on your side      One other important factor in getting relaxed is your mind. Your mind and body are connected. The mind influences the body and the body influences the mind. What you do with your mind when you are trying to relax is very important. The key is to avoid thinking about stressful things. You can think about       Neutral things (e.g., counting, saying a word like calm or relax)    Pleasant things (e.g., imagining a pleasant place)    6. Return to regular breathing, continuing to breathe so that only your stomach moves. 7.  It is recommended that you practice 2 times per day, 10 minutes each time. Deep Breathing Exercises  Exercise 1:  The 4-7-8 (or Relaxing Breath) Exercise  This exercise is utterly simple, takes almost no time, requires no equipment and can be done anywhere. Although you can do the exercise in any position, sit with your back straight while learning the exercise. Place the tip of your tongue against the ridge of tissue just behind your upper front teeth, and keep it there through the entire exercise. You will be exhaling through your mouth around your tongue; try pursing your lips slightly if this seems awkward.  Exhale completely through your mouth, making a whoosh sound.  Close your mouth and inhale quietly through your nose to a mental count of four.  Hold your breath for a count of seven.  Exhale completely through your mouth, making a whoosh sound to a count of eight.  This is one breath.  Now inhale again and repeat the cycle three more yourself as you exhale.  The next time you exhale, count \"two,\" and so on up to Edwin. \"   Then begin a new cycle, counting \"one\" on the next exhalation. Never count higher than \"five,\" and count only when you exhale. You will know your attention has wandered when you find yourself up to \"eight,\" \"12,\" even \"19. \"  Try to do 10 minutes of this form of meditation. Progressive Muscle Relaxation  Progressive Muscle Relaxation is a relaxation technique used to release stress. It can relax the muscles and lower blood pressure, heart rate, and respiration. Progressive Muscle Relaxation is the tensing and then relaxing each muscle group of the body, one group at a time. Though this technique is simple it may take several sessions before it is 'mastered.'   Some people prefer to listen to an audiotape (or CD or mp3) that guides one through progressive muscle relaxation. The scripts are usually about 20 minutes long. Progressive muscle relaxation may be done sitting or lying down. Tense up a group of muscles - tense hard but don't strain - and hold for about 5-10 seconds. Release the tension from the muscles all at once. Stay relaxed for 10 - 20 seconds. Some people prefer to count, for example:  Tense for count of 5  Release all at once  Rest for count of 10  . ..or  Tense for count of 10  Release all at once  Rest for count of 20  Pay close attention to the feeling of relaxation when you release the contracted muscles. When going through the muscle groups, some people start with the hands, others with the feet. You may do one side of the body (hand, arm, leg, foot) at a time or do both sides at the same time. Listening to a prerecorded script that guides you through the process is helpful.   Sample of Progressive Muscle Relaxation Exercise:   Hands - Clench fists  tense for 5, release, rest for 10      Right forearms and hands - Extend arm, elbow locked, and bend hand back at the wrist  tense for 5, release, rest for 10      Upper right arm - Bend arms at elbows and flex biceps  tense for 5, release, rest for 10     Forehead - wrinkle forehead into frown, tense, release, rest, and/or raise eyebrows  tense for 5, release, rest for 10      Eyes - close eyes tightly, hold and release  tense for 5, release, rest for 10      Mouth - press lips tightly together  tense for 5, release, rest for 10      Jaw - open mouth wide and stick out tongue  tense for 5, release, rest for 10      Buttocks - tense  tense for 5, release, rest for 10      Abdomen  tense for 5, release, rest for 10      Chest  tense for 5, release, rest for 10      Back - arch back  tense for 5, release, rest for 10      Neck and shoulders  tense for 5, release, rest for 10      Thighs  tense for 5, release, rest for 10      Lower legs and feet - Point toes toward shin  tense for 5, release, rest for 10      Feet - Point toes and curl them under  tense for 5, release, rest for 10     You may repeat relaxing and tensing muscle groups that have you have already done to relax them further. NEGATIVE AUTOMATIC THOUGHTS    1. Mind reading: You assume that you know what people think without having sufficient evidence of their thoughts. \"He thinks I'm a loser. \"  2. Fortune telling: You predict the future- that things will get worse or that there is danger ahead. \"I'll fail that exam\" and \"I won't get the job. \"  3. Catastrophizing: You believe that what has happened or will happen will be so awful and unbearable that you won't be able to stand it. \"It would be terrible if I failed. \"  4. Labeling: You assign global negative traits to yourself and others. \"Im a failure\" or \"He's a rotten person. \"  5. Discounting positives: You claim that the positives that you or others attain are trivial. \"That's what wives are supposed to do--so it doesn't count when she's nice to me. \" \"Those successes were easy, so they don't matter. \"  6. Negative filter:  You focus arguments that might contradict your negative thoughts. For example, when you have the thought \"I'm unlovable\", you reject as irrelevant any evidence that people like you. Consequently, your thought cannot be refuted. \"That's not the real issue. There are deeper problems. There are other factors. \"  17. Judgment Focus: You view yourself, others and events in terms of evaluations of good, bad or superior-inferior, rather than simply describing, accepting, or understanding. You are continually measuring yourself and others according to arbitrary standards, finding that you and others fall short. You are focused on the judgments of others as well as your own judgments of yourself. \"I didn't perform well in college\" or \"If I take up tennis, I won't do well\" or \"Look how successful she is. I'm not successful\". Challenging Upsetting Thinking  ________________________________________________________________________  Examine your thoughts for key words:     1. must, need, got to, have to, should (unrealistic standards)   2. never, always, completely, totally, all everything, everyone (predictions / labeling)   3. awful, terrible, horrible, unbearable, disaster, worst ever (labeling / predictions)   4. jerk, slob, creep, hypocrite, bully, stupid (labels)    Dispute or question the accuracy of the questionable thoughts. 1. Am I upsetting myself unnecessarily? How can I see this another way? 2. Is my thinking working for or against me? How could I view this in a less    upsetting way? 3. What am I demanding must happen? What do I want or prefer, rather than need? 4. Am I making something too terrible? Is it really that awful? What would be so     terrible about that? 5. Am I labeling a person? What is the action that I dont like? 6. Whats untrue about my thoughts? How can I stick to the facts? Whats the proof    for what I am thinking or believing about this? 7.  Am I using extreme, extreme or catastrophic rating words (horrible, terrible, disaster. awful) of an event, a rating which implies that things couldn't be worse and you will not be able to survive the event. B. Instead of using this extreme rating when it doesn't fully apply, think of the event in terms of degree of disappointment or inconvenience. Other words might better describe the relative severity of the event such as annoying, nuisance, irritating, unfortunate, unlucky, frustration, or problem. Ermalene Rink for the extreme or overly general rating of a person (loser, stupid, inconsiderate, pushy, selfish, jerk, incompetent), something which implies that there are good and bad people in the world and this person definitely is part of the bad group. D. Focus your judgment more on the specific action as the problem rather than what you believe is the general type of person involved. Realize that you are on shaky ground whenever you think you can fairly and without a doubt categorize someone as totally fitting a particular type. It is much more relevant to think in terms of the actions  which someone did that you disagree with or you see as mistakes. This pertains to your rating of your self as well as the self of another. Examples:     Alarming Evaluation: Agustin Andujar, trying to stay on this diet is terrible; I can't take it anymore. Reassuring Evaluation: Not being able to eat exactly what I used to sure feels frustrating sometimes, but I can manage. Alarming Evaluation: He just doesn't know what the hell he's doing. Besides that, he doesn't give a damn about anybody but himself. Reassuring Evaluation: I think he's making a mistake here by not involving the rest of the staff in this decision. 3.  Strategies to Change Alarming Expectations. Aminah Andrea out the element of truth or the preference in your alarming expectation.    B.  Remove the absolute demand words (must, should, need, have to) and replace them with
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
## 2020-08-10 ENCOUNTER — OFFICE VISIT (OUTPATIENT)
Dept: FAMILY MEDICINE CLINIC | Age: 40
End: 2020-08-10
Payer: COMMERCIAL

## 2020-08-10 VITALS
BODY MASS INDEX: 24.72 KG/M2 | DIASTOLIC BLOOD PRESSURE: 70 MMHG | SYSTOLIC BLOOD PRESSURE: 100 MMHG | OXYGEN SATURATION: 98 % | WEIGHT: 144 LBS | HEART RATE: 90 BPM | TEMPERATURE: 98 F

## 2020-08-10 LAB
A/G RATIO: 1.5 (ref 1.1–2.2)
ALBUMIN SERPL-MCNC: 4 G/DL (ref 3.4–5)
ALP BLD-CCNC: 61 U/L (ref 40–129)
ALT SERPL-CCNC: 13 U/L (ref 10–40)
ANION GAP SERPL CALCULATED.3IONS-SCNC: 12 MMOL/L (ref 3–16)
AST SERPL-CCNC: 13 U/L (ref 15–37)
BILIRUB SERPL-MCNC: <0.2 MG/DL (ref 0–1)
BUN BLDV-MCNC: 16 MG/DL (ref 7–20)
CALCIUM SERPL-MCNC: 8.9 MG/DL (ref 8.3–10.6)
CHLORIDE BLD-SCNC: 101 MMOL/L (ref 99–110)
CHOLESTEROL, TOTAL: 194 MG/DL (ref 0–199)
CO2: 26 MMOL/L (ref 21–32)
CREAT SERPL-MCNC: 0.9 MG/DL (ref 0.6–1.1)
GFR AFRICAN AMERICAN: >60
GFR NON-AFRICAN AMERICAN: >60
GLOBULIN: 2.6 G/DL
GLUCOSE BLD-MCNC: 85 MG/DL (ref 70–99)
HDLC SERPL-MCNC: 44 MG/DL (ref 40–60)
LDL CHOLESTEROL CALCULATED: 127 MG/DL
POTASSIUM SERPL-SCNC: 4.4 MMOL/L (ref 3.5–5.1)
SODIUM BLD-SCNC: 139 MMOL/L (ref 136–145)
TOTAL PROTEIN: 6.6 G/DL (ref 6.4–8.2)
TRIGL SERPL-MCNC: 114 MG/DL (ref 0–150)
VLDLC SERPL CALC-MCNC: 23 MG/DL

## 2020-08-10 PROCEDURE — 36415 COLL VENOUS BLD VENIPUNCTURE: CPT | Performed by: NURSE PRACTITIONER

## 2020-08-10 PROCEDURE — 99396 PREV VISIT EST AGE 40-64: CPT | Performed by: NURSE PRACTITIONER

## 2020-08-10 RX ORDER — MONTELUKAST SODIUM 10 MG/1
TABLET ORAL
Qty: 30 TABLET | Refills: 5 | Status: SHIPPED | OUTPATIENT
Start: 2020-08-10 | End: 2021-05-03

## 2020-08-10 ASSESSMENT — ENCOUNTER SYMPTOMS
CONSTIPATION: 0
SORE THROAT: 0
SHORTNESS OF BREATH: 0
WHEEZING: 0
ABDOMINAL PAIN: 0
DIARRHEA: 0
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
## 2020-08-28 ENCOUNTER — OFFICE VISIT (OUTPATIENT)
Dept: PSYCHOLOGY | Age: 40
End: 2020-08-28
Payer: COMMERCIAL

## 2020-08-28 VITALS — TEMPERATURE: 98.7 F

## 2020-08-28 PROCEDURE — 90832 PSYTX W PT 30 MINUTES: CPT | Performed by: PSYCHOLOGIST

## 2020-08-28 ASSESSMENT — PATIENT HEALTH QUESTIONNAIRE - PHQ9
SUM OF ALL RESPONSES TO PHQ9 QUESTIONS 1 & 2: 2
SUM OF ALL RESPONSES TO PHQ QUESTIONS 1-9: 2
1. LITTLE INTEREST OR PLEASURE IN DOING THINGS: 1
SUM OF ALL RESPONSES TO PHQ QUESTIONS 1-9: 2
2. FEELING DOWN, DEPRESSED OR HOPELESS: 1

## 2020-08-28 NOTE — PROGRESS NOTES
Behavioral Health Consultation  Annette Zavala, Ph.D.  Psychologist  8/28/2020  3:38 PM EDT      Time spent with Patient: 20 minutes  This is patient's second  [facility] appointment. Reason for Consult:    Chief Complaint   Patient presents with    Anxiety     Referring Provider: Noemi Fregoso MD  [facility], [address] [place] [address]    Feedback given to PCP. S:  Patient reported that her school ended up transitioning to virtual for the first month of the year. Processed how this change impacted her preparation for school. Patient notes that she is still recovering from surgery 7 weeks ago. Notes that she hopes to feel more \"like myself\" when her pain and numbness wear off. Regarding her anxiety, she has been feeling more calm, even-keeled since starting Zoloft. Plan to f/u PRN. O:  MSE:    Appearance: good hygiene   Attitude: cooperative and friendly  Consciousness: alert  Orientation: oriented to person, place, time, general circumstance  Memory: recent and remote memory intact  Attention/Concentration: intact during session  Psychomotor Activity:normal  Eye Contact: normal  Speech: normal rate and volume, well-articulated  Mood: anxious  Affect: anxious  Perception: within normal limits  Thought Content: all-or-none thinking and intrusive thoughts  Thought Process: logical, coherent and goal-directed  Insight: good  Judgment: intact  Ability to understand instructions: Yes  Ability to respond meaningfully: Yes  Morbid Ideation: no   Suicide Assessment: no suicidal ideation, plan, or intent  Homicidal Ideation: no     History:    Medications:   Current Outpatient Medications   Medication Sig Dispense Refill    montelukast (SINGULAIR) 10 MG tablet TAKE ONE TABLET BY MOUTH DAILY AS NEEDED FOR ALLERGIES 30 tablet 5    sertraline (ZOLOFT) 50 MG tablet Take 50 mg by mouth daily      triamcinolone (KENALOG) 0.1 % cream Apply topically 2 times daily.  (Patient not taking: Reported on 7/2/2020) 45 g 0    Erenumab-aooe (AIMOVIG) 70 MG/ML SOAJ Inject 70 mg into the skin every 30 days 1 pen 5    ondansetron (ZOFRAN) 4 MG tablet Take 1 tablet by mouth every 8 hours as needed for Nausea or Vomiting 20 tablet 5    promethazine (PHENERGAN) 12.5 MG tablet Take 1 tablet by mouth every 8 hours as needed for Nausea 20 tablet 5    Multiple Vitamins-Minerals (THERAPEUTIC MULTIVITAMIN-MINERALS) tablet Take 1 tablet by mouth daily      RIBOFLAVIN PO Take by mouth      MAGNESIUM PO Take 1 tablet by mouth daily        No current facility-administered medications for this visit.       Social History:   Social History     Socioeconomic History    Marital status:      Spouse name: Not on file    Number of children: Not on file    Years of education: Not on file    Highest education level: Not on file   Occupational History    Not on file   Social Needs    Financial resource strain: Not on file    Food insecurity     Worry: Not on file     Inability: Not on file    Transportation needs     Medical: Not on file     Non-medical: Not on file   Tobacco Use    Smoking status: Never Smoker    Smokeless tobacco: Never Used   Substance and Sexual Activity    Alcohol use: No    Drug use: No    Sexual activity: Yes     Partners: Male   Lifestyle    Physical activity     Days per week: Not on file     Minutes per session: Not on file    Stress: Not on file   Relationships    Social connections     Talks on phone: Not on file     Gets together: Not on file     Attends Evangelical service: Not on file     Active member of club or organization: Not on file     Attends meetings of clubs or organizations: Not on file     Relationship status: Not on file    Intimate partner violence     Fear of current or ex partner: Not on file     Emotionally abused: Not on file     Physically abused: Not on file     Forced sexual activity: Not on file   Other Topics Concern    Not on file   Social History Narrative    Not on file

## 2021-01-01 DIAGNOSIS — G43.119 INTRACTABLE MIGRAINE WITH AURA WITHOUT STATUS MIGRAINOSUS: ICD-10-CM

## 2021-01-04 RX ORDER — ERENUMAB-AOOE 70 MG/ML
INJECTION SUBCUTANEOUS
Qty: 1 PEN | Refills: 4 | Status: SHIPPED | OUTPATIENT
Start: 2021-01-04 | End: 2021-06-21

## 2021-05-03 DIAGNOSIS — Z91.09 ENVIRONMENTAL ALLERGIES: ICD-10-CM

## 2021-05-03 RX ORDER — MONTELUKAST SODIUM 10 MG/1
TABLET ORAL
Qty: 30 TABLET | Refills: 4 | Status: SHIPPED | OUTPATIENT
Start: 2021-05-03 | End: 2021-08-23 | Stop reason: SDUPTHER

## 2021-06-19 DIAGNOSIS — G43.119 INTRACTABLE MIGRAINE WITH AURA WITHOUT STATUS MIGRAINOSUS: ICD-10-CM

## 2021-06-21 RX ORDER — ERENUMAB-AOOE 70 MG/ML
INJECTION SUBCUTANEOUS
Qty: 1 PEN | Refills: 3 | Status: SHIPPED | OUTPATIENT
Start: 2021-06-21 | End: 2021-08-23 | Stop reason: SDUPTHER

## 2021-08-23 ENCOUNTER — OFFICE VISIT (OUTPATIENT)
Dept: FAMILY MEDICINE CLINIC | Age: 41
End: 2021-08-23
Payer: COMMERCIAL

## 2021-08-23 ENCOUNTER — TELEPHONE (OUTPATIENT)
Dept: ADMINISTRATIVE | Age: 41
End: 2021-08-23

## 2021-08-23 VITALS
DIASTOLIC BLOOD PRESSURE: 60 MMHG | HEART RATE: 80 BPM | BODY MASS INDEX: 25.69 KG/M2 | HEIGHT: 63 IN | WEIGHT: 145 LBS | SYSTOLIC BLOOD PRESSURE: 106 MMHG | OXYGEN SATURATION: 98 %

## 2021-08-23 DIAGNOSIS — F41.9 ANXIETY: ICD-10-CM

## 2021-08-23 DIAGNOSIS — Z00.00 PHYSICAL EXAM: Primary | ICD-10-CM

## 2021-08-23 DIAGNOSIS — G43.119 INTRACTABLE MIGRAINE WITH AURA WITHOUT STATUS MIGRAINOSUS: ICD-10-CM

## 2021-08-23 DIAGNOSIS — G43.109 MIGRAINE WITH AURA AND WITHOUT STATUS MIGRAINOSUS, NOT INTRACTABLE: ICD-10-CM

## 2021-08-23 DIAGNOSIS — Z91.09 ENVIRONMENTAL ALLERGIES: ICD-10-CM

## 2021-08-23 PROCEDURE — 99396 PREV VISIT EST AGE 40-64: CPT | Performed by: NURSE PRACTITIONER

## 2021-08-23 RX ORDER — PROMETHAZINE HYDROCHLORIDE 12.5 MG/1
12.5 TABLET ORAL EVERY 8 HOURS PRN
Qty: 20 TABLET | Refills: 5 | Status: SHIPPED | OUTPATIENT
Start: 2021-08-23

## 2021-08-23 RX ORDER — MONTELUKAST SODIUM 10 MG/1
TABLET ORAL
Qty: 30 TABLET | Refills: 4 | Status: SHIPPED | OUTPATIENT
Start: 2021-08-23 | End: 2022-08-01

## 2021-08-23 RX ORDER — ONDANSETRON 4 MG/1
4 TABLET, FILM COATED ORAL EVERY 8 HOURS PRN
Qty: 20 TABLET | Refills: 5 | Status: SHIPPED | OUTPATIENT
Start: 2021-08-23

## 2021-08-23 RX ORDER — ERENUMAB-AOOE 70 MG/ML
INJECTION SUBCUTANEOUS
Qty: 1 PEN | Refills: 3 | Status: SHIPPED | OUTPATIENT
Start: 2021-08-23 | End: 2022-02-23 | Stop reason: SDUPTHER

## 2021-08-23 ASSESSMENT — ENCOUNTER SYMPTOMS
WHEEZING: 0
DIARRHEA: 0
SHORTNESS OF BREATH: 0
CONSTIPATION: 0
ABDOMINAL PAIN: 0
SORE THROAT: 0
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
## 2021-08-23 NOTE — TELEPHONE ENCOUNTER
Submitted PA for Aimovig 70MG/ML auto-injectors, Key: [id number]. Medication has been APPROVED. Will scan approval letter once received. Please notify patient. Thank you.

## 2021-08-23 NOTE — TELEPHONE ENCOUNTER
Received APPROVAL for Aimovig 70MG/ML auto-injectors. Key [id number]. Will scan approval letter once received. Please notify patient. Thank you.

## 2022-01-25 ENCOUNTER — TELEPHONE (OUTPATIENT)
Dept: FAMILY MEDICINE CLINIC | Age: 42
End: 2022-01-25

## 2022-01-25 DIAGNOSIS — G43.909 MIGRAINE WITHOUT STATUS MIGRAINOSUS, NOT INTRACTABLE, UNSPECIFIED MIGRAINE TYPE: Primary | ICD-10-CM

## 2022-01-25 NOTE — TELEPHONE ENCOUNTER
Submitted PA for Aimovig 70MG/ML auto-injectors, Key: [id number]. Medication has been DENIED. See attached denial letter. Please notify patient. Thank you.

## 2022-01-31 ENCOUNTER — PATIENT MESSAGE (OUTPATIENT)
Dept: FAMILY MEDICINE CLINIC | Age: 42
End: 2022-01-31

## 2022-01-31 DIAGNOSIS — G43.119 INTRACTABLE MIGRAINE WITH AURA WITHOUT STATUS MIGRAINOSUS: ICD-10-CM

## 2022-01-31 RX ORDER — GALCANEZUMAB 120 MG/ML
INJECTION, SOLUTION SUBCUTANEOUS
Qty: 4 EACH | Refills: 3 | Status: SHIPPED | OUTPATIENT
Start: 2022-01-31 | End: 2022-07-26 | Stop reason: SDUPTHER

## 2022-01-31 NOTE — TELEPHONE ENCOUNTER
Let patient know her insurance won't cover the aimovig- has to try ajovy or emgality- I would recommend emgality if she is willing to try this

## 2022-02-23 DIAGNOSIS — G43.119 INTRACTABLE MIGRAINE WITH AURA WITHOUT STATUS MIGRAINOSUS: ICD-10-CM

## 2022-02-23 RX ORDER — ERENUMAB-AOOE 70 MG/ML
INJECTION SUBCUTANEOUS
Qty: 1 ML | OUTPATIENT
Start: 2022-02-23

## 2022-02-23 RX ORDER — ERENUMAB-AOOE 70 MG/ML
INJECTION SUBCUTANEOUS
Qty: 1 PEN | Refills: 3 | Status: SHIPPED | OUTPATIENT
Start: 2022-02-23 | End: 2022-07-26

## 2022-02-23 NOTE — TELEPHONE ENCOUNTER
Last Office Visit  -  8/23/21  Next Office Visit  -  n/a    Last Filled  -    Last UDS -    Contract -

## 2022-03-03 ENCOUNTER — TELEPHONE (OUTPATIENT)
Dept: FAMILY MEDICINE CLINIC | Age: 42
End: 2022-03-03

## 2022-03-03 NOTE — TELEPHONE ENCOUNTER
If they can- try emgality again- we got a PA for aimovig that said she had to try emgality of Ajovy first and I sent something back to them saying I tried to order emgality and got the same PA
Looks like these medications were sent to [facility] so not sure why [facility] is even sending us this or calling us. Also tried calling the number back and it just kept giving me promotions to accept nothing else.
Received call from [address] stating that they received 2 PA's for injectables.  Would like to know if they should continue PA for Erenumab-aooe (AIMOVIG) 70 MG/ML SOAJ OR try another PA for Galcanezumab-gnlm [facility]) 120 MG/ML SOSY     Please call   Ph. [phone]
normal (ped)...

## 2022-03-23 DIAGNOSIS — F41.9 ANXIETY: ICD-10-CM

## 2022-04-25 DIAGNOSIS — F41.9 ANXIETY: ICD-10-CM

## 2022-04-25 NOTE — TELEPHONE ENCOUNTER
Received fax from [address] [place] [address] requesting sertraline (ZOLOFT) 50 MG tablet    Last Office Visit  -  8/23/21  Next Office Visit  -  None

## 2022-05-05 ENCOUNTER — PATIENT MESSAGE (OUTPATIENT)
Dept: FAMILY MEDICINE CLINIC | Age: 42
End: 2022-05-05

## 2022-05-05 NOTE — TELEPHONE ENCOUNTER
From: Daniela Reed  To: Deirdre Brewster  Sent: 5/5/2022 10:54 AM EDT  Subject: Migraine prescription problems again    Currently the insurance is being problematic with the aimovig and using the [employer] access card. After speaking with a person who works with the access card and she said part of the program changed and so many people are having problems with having to show proof of need for aimovig and the coverage. She told me about [employer] ([email or web address] com/ncz-dn-kptcl.html) and that I might get coverage for the prescription through this program. I filled out the application and will be dropping it off at the office today or tomorrow for you to fill out your part. The last step is to have the physician fax it to [phone]    I hope this will make it easier to get this prescription that has been life changing for me.      Thanks

## 2022-05-12 ENCOUNTER — TELEPHONE (OUTPATIENT)
Dept: FAMILY MEDICINE CLINIC | Age: 42
End: 2022-05-12

## 2022-05-12 NOTE — TELEPHONE ENCOUNTER
Antonia Party requesting more information about the Aimovig Assistnce Application form that was sent to them by [facility]. This form was scanned into media 5/9/22.   Megan 30. [phone]

## 2022-05-16 ENCOUNTER — TELEPHONE (OUTPATIENT)
Dept: FAMILY MEDICINE CLINIC | Age: 42
End: 2022-05-16

## 2022-05-16 NOTE — TELEPHONE ENCOUNTER
Submitted PA for Emgality 120MG/ML syringes (migraine), Key: [id number]. Medication has been APPROVED through 08/17/2022. Please notify patient. Thank you.

## 2022-05-16 NOTE — TELEPHONE ENCOUNTER
DEXTER   Spoke with Neelam at [facility] and she states pt is not elligible due to too high of income. Ritchie states with pt having commercial insurance though she could call [employer] access card at [phone].  This will make it to where she can get this medication at a very low cost.     [facility] for pt to call back to notify her

## 2022-05-16 NOTE — TELEPHONE ENCOUNTER
Pt was told by her [employer] to have her PCP call them so that they can push through Prior Authorization for [facility] medication, for her migraines.     Dean Guzman 30. [address] ID# [id number]       Lisset Wong [facility]) [address] [place] [address] [id number] [facility] 80 [address] [facility] -  [phone]

## 2022-05-16 NOTE — TELEPHONE ENCOUNTER
Patient phoned back and was given message. She states she will call her insurance card to see if something has changed with them.

## 2022-05-23 ENCOUNTER — TELEPHONE (OUTPATIENT)
Dept: ADMINISTRATIVE | Age: 42
End: 2022-05-23

## 2022-07-13 ENCOUNTER — TELEPHONE (OUTPATIENT)
Dept: FAMILY MEDICINE CLINIC | Age: 42
End: 2022-07-13

## 2022-07-26 ENCOUNTER — OFFICE VISIT (OUTPATIENT)
Dept: FAMILY MEDICINE CLINIC | Age: 42
End: 2022-07-26
Payer: COMMERCIAL

## 2022-07-26 VITALS
BODY MASS INDEX: 26.75 KG/M2 | HEART RATE: 68 BPM | WEIGHT: 151 LBS | OXYGEN SATURATION: 98 % | SYSTOLIC BLOOD PRESSURE: 102 MMHG | DIASTOLIC BLOOD PRESSURE: 66 MMHG | HEIGHT: 63 IN

## 2022-07-26 DIAGNOSIS — Z86.2 HISTORY OF ANEMIA: ICD-10-CM

## 2022-07-26 DIAGNOSIS — Z00.00 PHYSICAL EXAM: ICD-10-CM

## 2022-07-26 DIAGNOSIS — G43.909 MIGRAINE WITHOUT STATUS MIGRAINOSUS, NOT INTRACTABLE, UNSPECIFIED MIGRAINE TYPE: ICD-10-CM

## 2022-07-26 DIAGNOSIS — R53.83 FATIGUE, UNSPECIFIED TYPE: ICD-10-CM

## 2022-07-26 DIAGNOSIS — Z00.00 PHYSICAL EXAM: Primary | ICD-10-CM

## 2022-07-26 LAB
A/G RATIO: 1.6 (ref 1.1–2.2)
ALBUMIN SERPL-MCNC: 4.2 G/DL (ref 3.4–5)
ALP BLD-CCNC: 65 U/L (ref 40–129)
ALT SERPL-CCNC: 13 U/L (ref 10–40)
ANION GAP SERPL CALCULATED.3IONS-SCNC: 13 MMOL/L (ref 3–16)
AST SERPL-CCNC: 14 U/L (ref 15–37)
BASOPHILS ABSOLUTE: 0 K/UL (ref 0–0.2)
BASOPHILS RELATIVE PERCENT: 0.6 %
BILIRUB SERPL-MCNC: 0.5 MG/DL (ref 0–1)
BUN BLDV-MCNC: 13 MG/DL (ref 7–20)
CALCIUM SERPL-MCNC: 9.1 MG/DL (ref 8.3–10.6)
CHLORIDE BLD-SCNC: 105 MMOL/L (ref 99–110)
CHOLESTEROL, TOTAL: 193 MG/DL (ref 0–199)
CO2: 25 MMOL/L (ref 21–32)
CREAT SERPL-MCNC: 0.7 MG/DL (ref 0.6–1.1)
EOSINOPHILS ABSOLUTE: 0.1 K/UL (ref 0–0.6)
EOSINOPHILS RELATIVE PERCENT: 2 %
FERRITIN: 40.1 NG/ML (ref 15–150)
GFR AFRICAN AMERICAN: >60
GFR NON-AFRICAN AMERICAN: >60
GLUCOSE BLD-MCNC: 85 MG/DL (ref 70–99)
HCT VFR BLD CALC: 36.2 % (ref 36–48)
HDLC SERPL-MCNC: 45 MG/DL (ref 40–60)
HEMOGLOBIN: 12 G/DL (ref 12–16)
IRON SATURATION: 34 % (ref 15–50)
IRON: 95 UG/DL (ref 37–145)
LDL CHOLESTEROL CALCULATED: 126 MG/DL
LYMPHOCYTES ABSOLUTE: 1.7 K/UL (ref 1–5.1)
LYMPHOCYTES RELATIVE PERCENT: 24.2 %
MCH RBC QN AUTO: 28.6 PG (ref 26–34)
MCHC RBC AUTO-ENTMCNC: 33 G/DL (ref 31–36)
MCV RBC AUTO: 86.6 FL (ref 80–100)
MONOCYTES ABSOLUTE: 0.5 K/UL (ref 0–1.3)
MONOCYTES RELATIVE PERCENT: 7.1 %
NEUTROPHILS ABSOLUTE: 4.7 K/UL (ref 1.7–7.7)
NEUTROPHILS RELATIVE PERCENT: 66.1 %
PDW BLD-RTO: 13.5 % (ref 12.4–15.4)
PLATELET # BLD: 303 K/UL (ref 135–450)
PMV BLD AUTO: 8.1 FL (ref 5–10.5)
POTASSIUM SERPL-SCNC: 4.6 MMOL/L (ref 3.5–5.1)
RBC # BLD: 4.18 M/UL (ref 4–5.2)
SODIUM BLD-SCNC: 143 MMOL/L (ref 136–145)
TOTAL IRON BINDING CAPACITY: 282 UG/DL (ref 260–445)
TOTAL PROTEIN: 6.8 G/DL (ref 6.4–8.2)
TRIGL SERPL-MCNC: 109 MG/DL (ref 0–150)
TSH REFLEX FT4: 1.7 UIU/ML (ref 0.27–4.2)
VLDLC SERPL CALC-MCNC: 22 MG/DL
WBC # BLD: 7.1 K/UL (ref 4–11)

## 2022-07-26 PROCEDURE — 99396 PREV VISIT EST AGE 40-64: CPT | Performed by: NURSE PRACTITIONER

## 2022-07-26 RX ORDER — GALCANEZUMAB 120 MG/ML
120 INJECTION, SOLUTION SUBCUTANEOUS
Qty: 4 EACH | Refills: 3 | Status: SHIPPED | OUTPATIENT
Start: 2022-07-26 | End: 2022-10-31

## 2022-07-26 ASSESSMENT — PATIENT HEALTH QUESTIONNAIRE - PHQ9
8. MOVING OR SPEAKING SO SLOWLY THAT OTHER PEOPLE COULD HAVE NOTICED. OR THE OPPOSITE, BEING SO FIGETY OR RESTLESS THAT YOU HAVE BEEN MOVING AROUND A LOT MORE THAN USUAL: 0
1. LITTLE INTEREST OR PLEASURE IN DOING THINGS: 0
SUM OF ALL RESPONSES TO PHQ QUESTIONS 1-9: 2
SUM OF ALL RESPONSES TO PHQ QUESTIONS 1-9: 2
3. TROUBLE FALLING OR STAYING ASLEEP: 1
6. FEELING BAD ABOUT YOURSELF - OR THAT YOU ARE A FAILURE OR HAVE LET YOURSELF OR YOUR FAMILY DOWN: 0
SUM OF ALL RESPONSES TO PHQ9 QUESTIONS 1 & 2: 0
9. THOUGHTS THAT YOU WOULD BE BETTER OFF DEAD, OR OF HURTING YOURSELF: 0
7. TROUBLE CONCENTRATING ON THINGS, SUCH AS READING THE NEWSPAPER OR WATCHING TELEVISION: 0
2. FEELING DOWN, DEPRESSED OR HOPELESS: 0
SUM OF ALL RESPONSES TO PHQ QUESTIONS 1-9: 2
5. POOR APPETITE OR OVEREATING: 0
4. FEELING TIRED OR HAVING LITTLE ENERGY: 1
10. IF YOU CHECKED OFF ANY PROBLEMS, HOW DIFFICULT HAVE THESE PROBLEMS MADE IT FOR YOU TO DO YOUR WORK, TAKE CARE OF THINGS AT HOME, OR GET ALONG WITH OTHER PEOPLE: 1
SUM OF ALL RESPONSES TO PHQ QUESTIONS 1-9: 2

## 2022-07-26 ASSESSMENT — ENCOUNTER SYMPTOMS
SHORTNESS OF BREATH: 0
DIARRHEA: 0
SORE THROAT: 0
WHEEZING: 0
CONSTIPATION: 0
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
## 2022-07-31 DIAGNOSIS — Z91.09 ENVIRONMENTAL ALLERGIES: ICD-10-CM

## 2022-08-01 RX ORDER — MONTELUKAST SODIUM 10 MG/1
TABLET ORAL
Qty: 30 TABLET | Refills: 4 | Status: SHIPPED | OUTPATIENT
Start: 2022-08-01

## 2022-08-25 ENCOUNTER — TELEPHONE (OUTPATIENT)
Dept: ADMINISTRATIVE | Age: 42
End: 2022-08-25

## 2022-08-25 NOTE — TELEPHONE ENCOUNTER
Submitted PA for [facility]  [facility] Key: [id number] STATUS: APPROVAL LETTER ATTACHED. If this requires a response please respond to the pool. [facility] [address] [place] [address]). Please advise patient thank you.

## 2022-10-05 DIAGNOSIS — F41.9 ANXIETY: ICD-10-CM

## 2022-10-31 ENCOUNTER — TELEPHONE (OUTPATIENT)
Dept: FAMILY MEDICINE CLINIC | Age: 42
End: 2022-10-31

## 2022-10-31 RX ORDER — FREMANEZUMAB-VFRM 225 MG/1.5ML
225 INJECTION SUBCUTANEOUS
Qty: 4 ADJUSTABLE DOSE PRE-FILLED PEN SYRINGE | Refills: 4 | Status: SHIPPED | OUTPATIENT
Start: 2022-10-31

## 2022-10-31 NOTE — TELEPHONE ENCOUNTER
Pt calling in today with med issue. The Emgality is not in stock at the pharmacy any longer and not available through the  either. Pt insurance will cover the Ajovy, but will need prior authorization for this. Are we able to prescribe this? Pt stated she is out and needs the med very soon.       [facility]ga [place] rio

## 2022-11-03 NOTE — PROGRESS NOTES
Roxicodone given for pain control, pt swallowed without difficulty. PT had episode of emesis roughly 30 minutes after administration. IV nausea medication given PT declined scheduled tylenol at this time r/t upset stomach\". There are no Wet Read(s) to document.

## 2022-11-15 ENCOUNTER — TELEPHONE (OUTPATIENT)
Dept: ADMINISTRATIVE | Age: 42
End: 2022-11-15

## 2022-12-13 NOTE — TELEPHONE ENCOUNTER
From: Olga Atkinson  To: Marikay Denver  Sent: 1/31/2022 11:57 AM EST  Subject: New prescription    What is the name of the new prescription you put in? Msg from [address] [place] [address] says insurance will not cover [id number] without authorization. We will contact your Dr. Fabián Shields can you or I do at this point? none

## 2023-03-14 ENCOUNTER — HOSPITAL ENCOUNTER (OUTPATIENT)
Dept: CT IMAGING | Age: 43
Discharge: HOME | End: 2023-03-14
Payer: COMMERCIAL

## 2023-03-14 ENCOUNTER — OFFICE VISIT (OUTPATIENT)
Dept: FAMILY MEDICINE CLINIC | Age: 43
End: 2023-03-14
Payer: COMMERCIAL

## 2023-03-14 VITALS
WEIGHT: 155 LBS | OXYGEN SATURATION: 97 % | SYSTOLIC BLOOD PRESSURE: 102 MMHG | BODY MASS INDEX: 27.46 KG/M2 | HEART RATE: 84 BPM | DIASTOLIC BLOOD PRESSURE: 70 MMHG

## 2023-03-14 DIAGNOSIS — R10.32 LEFT LOWER QUADRANT ABDOMINAL PAIN: ICD-10-CM

## 2023-03-14 DIAGNOSIS — R10.32 LEFT LOWER QUADRANT ABDOMINAL PAIN: Primary | ICD-10-CM

## 2023-03-14 LAB
BILIRUBIN, POC: NEGATIVE
BLOOD URINE, POC: NEGATIVE
CLARITY, POC: CLEAR
COLOR, POC: YELLOW
GLUCOSE URINE, POC: NEGATIVE
KETONES, POC: NEGATIVE
LEUKOCYTE EST, POC: NEGATIVE
NITRITE, POC: NEGATIVE
PH, POC: 7
PROTEIN, POC: NEGATIVE
SPECIFIC GRAVITY, POC: 1.02
UROBILINOGEN, POC: NORMAL

## 2023-03-14 PROCEDURE — 81002 URINALYSIS NONAUTO W/O SCOPE: CPT | Performed by: NURSE PRACTITIONER

## 2023-03-14 PROCEDURE — 74176 CT ABD & PELVIS W/O CONTRAST: CPT

## 2023-03-14 PROCEDURE — 99214 OFFICE O/P EST MOD 30 MIN: CPT | Performed by: NURSE PRACTITIONER

## 2023-03-14 RX ORDER — GALCANEZUMAB 120 MG/ML
INJECTION, SOLUTION SUBCUTANEOUS
COMMUNITY
Start: 2023-03-13

## 2023-03-14 ASSESSMENT — PATIENT HEALTH QUESTIONNAIRE - PHQ9
10. IF YOU CHECKED OFF ANY PROBLEMS, HOW DIFFICULT HAVE THESE PROBLEMS MADE IT FOR YOU TO DO YOUR WORK, TAKE CARE OF THINGS AT HOME, OR GET ALONG WITH OTHER PEOPLE: 0
9. THOUGHTS THAT YOU WOULD BE BETTER OFF DEAD, OR OF HURTING YOURSELF: 0
4. FEELING TIRED OR HAVING LITTLE ENERGY: 0
SUM OF ALL RESPONSES TO PHQ QUESTIONS 1-9: 0
3. TROUBLE FALLING OR STAYING ASLEEP: 0
SUM OF ALL RESPONSES TO PHQ QUESTIONS 1-9: 0
5. POOR APPETITE OR OVEREATING: 0
6. FEELING BAD ABOUT YOURSELF - OR THAT YOU ARE A FAILURE OR HAVE LET YOURSELF OR YOUR FAMILY DOWN: 0
2. FEELING DOWN, DEPRESSED OR HOPELESS: 0
SUM OF ALL RESPONSES TO PHQ9 QUESTIONS 1 & 2: 0
8. MOVING OR SPEAKING SO SLOWLY THAT OTHER PEOPLE COULD HAVE NOTICED. OR THE OPPOSITE, BEING SO FIGETY OR RESTLESS THAT YOU HAVE BEEN MOVING AROUND A LOT MORE THAN USUAL: 0
7. TROUBLE CONCENTRATING ON THINGS, SUCH AS READING THE NEWSPAPER OR WATCHING TELEVISION: 0
1. LITTLE INTEREST OR PLEASURE IN DOING THINGS: 0
SUM OF ALL RESPONSES TO PHQ QUESTIONS 1-9: 0
SUM OF ALL RESPONSES TO PHQ QUESTIONS 1-9: 0

## 2023-03-14 ASSESSMENT — ENCOUNTER SYMPTOMS
NAUSEA: 0
BLOOD IN STOOL: 0
ABDOMINAL PAIN: 1
DIARRHEA: 0
VOMITING: 0

## 2023-03-14 NOTE — PROGRESS NOTES
Patient: Dexter Junior is a 43 y.o. female who presents today with the following Chief Complaint(s):  Chief Complaint   Patient presents with    Abdominal Pain     Left lower quad, x1-2 months, daily          HPI  Left lower quadrant abdominal pain- been going on for 1-2 months daily. States it starts later in the mornings and then stays all day- . No diarrhea or constipation- has not been able to related to any certain foods. No change in diet. Has periods monthly- some cycles are shorter and some longer     Has derm appt at 115 today  Current Outpatient Medications   Medication Sig Dispense Refill    EMGALITY 120 MG/ML SOSY       sertraline (ZOLOFT) 50 MG tablet TAKE ONE TABLET BY MOUTH DAILY 30 tablet 4    montelukast (SINGULAIR) 10 MG tablet TAKE ONE TABLET BY MOUTH DAILY AS NEEDED FOR ALLERGIES 30 tablet 4    ondansetron (ZOFRAN) 4 MG tablet Take 1 tablet by mouth every 8 hours as needed for Nausea or Vomiting 20 tablet 5    promethazine (PHENERGAN) 12.5 MG tablet Take 1 tablet by mouth every 8 hours as needed for Nausea 20 tablet 5    Multiple Vitamins-Minerals (THERAPEUTIC MULTIVITAMIN-MINERALS) tablet Take 1 tablet by mouth daily      RIBOFLAVIN PO Take by mouth      MAGNESIUM PO Take 1 tablet by mouth daily       triamcinolone (KENALOG) 0.1 % cream Apply topically 2 times daily. (Patient not taking: Reported on 3/14/2023) 45 g 0     No current facility-administered medications for this visit. Patient's past medical history, surgical history, family history, medications,  andallergies  were all reviewed and updated as appropriate today. Review of Systems   Constitutional:  Negative for fever. Gastrointestinal:  Positive for abdominal pain (left lower quadrant pain-). Negative for blood in stool, diarrhea, nausea and vomiting. Has gas a lot with BM as well and still will have pain   Genitourinary:  Negative for difficulty urinating, dysuria, flank pain, frequency and urgency. Physical Exam  Vitals and nursing note reviewed. Constitutional:       Appearance: Normal appearance. She is well-developed. HENT:      Head: Normocephalic and atraumatic. Right Ear: External ear normal.      Left Ear: External ear normal.      Nose: Nose normal.      Mouth/Throat:      Pharynx: No oropharyngeal exudate or posterior oropharyngeal erythema. Eyes:      Conjunctiva/sclera: Conjunctivae normal.   Cardiovascular:      Rate and Rhythm: Normal rate and regular rhythm. Heart sounds: Normal heart sounds. No murmur heard. Pulmonary:      Effort: Pulmonary effort is normal. No respiratory distress. Breath sounds: Normal breath sounds. No wheezing or rales. Abdominal:      General: Bowel sounds are normal. There is no distension. Palpations: Abdomen is soft. Tenderness: There is abdominal tenderness (lower right and left tenderness). There is no rebound. Musculoskeletal:         General: Normal range of motion. Cervical back: Normal range of motion and neck supple. Lymphadenopathy:      Cervical: No cervical adenopathy. Skin:     General: Skin is warm and dry. Neurological:      General: No focal deficit present. Mental Status: She is alert and oriented to person, place, and time. Deep Tendon Reflexes: Reflexes are normal and symmetric. Psychiatric:         Mood and Affect: Mood normal.         Behavior: Behavior normal.         Thought Content: Thought content normal.         Judgment: Judgment normal.     Vitals:    03/14/23 1010   BP: 102/70   Pulse: 84   SpO2: 97%       Assessment:  Encounter Diagnosis   Name Primary? Left lower quadrant abdominal pain Yes       Plan:  1. Left lower quadrant abdominal pain  R/o ovarian cyst or other etiology  - POCT Urinalysis no Micro: normal  - CT ABDOMEN PELVIS WO CONTRAST Additional Contrast? None; Future      No follow-ups on file.

## 2023-03-17 DIAGNOSIS — F41.9 ANXIETY: ICD-10-CM

## 2023-03-17 NOTE — TELEPHONE ENCOUNTER
Last Office Visit  -  3/14/23  Next Office Visit  -  n/a    Last Filled  -  10/6/22  Last UDS -    Contract -

## 2023-05-04 DIAGNOSIS — Z91.09 ENVIRONMENTAL ALLERGIES: ICD-10-CM

## 2023-05-04 RX ORDER — MONTELUKAST SODIUM 10 MG/1
TABLET ORAL
Qty: 30 TABLET | Refills: 11 | Status: SHIPPED | OUTPATIENT
Start: 2023-05-04

## 2023-06-21 SDOH — ECONOMIC STABILITY: INCOME INSECURITY: HOW HARD IS IT FOR YOU TO PAY FOR THE VERY BASICS LIKE FOOD, HOUSING, MEDICAL CARE, AND HEATING?: NOT HARD AT ALL

## 2023-06-21 SDOH — ECONOMIC STABILITY: HOUSING INSECURITY
IN THE LAST 12 MONTHS, WAS THERE A TIME WHEN YOU DID NOT HAVE A STEADY PLACE TO SLEEP OR SLEPT IN A SHELTER (INCLUDING NOW)?: NO

## 2023-06-21 SDOH — ECONOMIC STABILITY: TRANSPORTATION INSECURITY
IN THE PAST 12 MONTHS, HAS LACK OF TRANSPORTATION KEPT YOU FROM MEETINGS, WORK, OR FROM GETTING THINGS NEEDED FOR DAILY LIVING?: NO

## 2023-06-21 SDOH — ECONOMIC STABILITY: FOOD INSECURITY: WITHIN THE PAST 12 MONTHS, YOU WORRIED THAT YOUR FOOD WOULD RUN OUT BEFORE YOU GOT MONEY TO BUY MORE.: NEVER TRUE

## 2023-06-21 SDOH — ECONOMIC STABILITY: FOOD INSECURITY: WITHIN THE PAST 12 MONTHS, THE FOOD YOU BOUGHT JUST DIDN'T LAST AND YOU DIDN'T HAVE MONEY TO GET MORE.: NEVER TRUE

## 2023-06-22 ENCOUNTER — OFFICE VISIT (OUTPATIENT)
Dept: FAMILY MEDICINE CLINIC | Age: 43
End: 2023-06-22
Payer: COMMERCIAL

## 2023-06-22 VITALS
BODY MASS INDEX: 25.69 KG/M2 | OXYGEN SATURATION: 96 % | SYSTOLIC BLOOD PRESSURE: 106 MMHG | HEART RATE: 80 BPM | WEIGHT: 145 LBS | DIASTOLIC BLOOD PRESSURE: 70 MMHG

## 2023-06-22 DIAGNOSIS — R19.7 DIARRHEA, UNSPECIFIED TYPE: ICD-10-CM

## 2023-06-22 DIAGNOSIS — R19.7 DIARRHEA, UNSPECIFIED TYPE: Primary | ICD-10-CM

## 2023-06-22 PROCEDURE — 99213 OFFICE O/P EST LOW 20 MIN: CPT | Performed by: NURSE PRACTITIONER

## 2023-06-22 ASSESSMENT — ENCOUNTER SYMPTOMS
DIARRHEA: 1
BLOOD IN STOOL: 0
NAUSEA: 0
VOMITING: 0

## 2023-06-22 NOTE — PROGRESS NOTES
Patient: Judd Miller is a 37 y.o. female who presents today with the following Chief Complaint(s):  Chief Complaint   Patient presents with    Diarrhea     3-4 weeks, sometimes uncontrollable, left side abdominal pain         HPI  Here with c/o Diarrhea for 3-4 weeks- will be 4 weeks next Tuesday. Has been taking probiotic for awhile- no change in foods- has not been on antibiotics. No fever/chills     Current Outpatient Medications   Medication Sig Dispense Refill    montelukast (SINGULAIR) 10 MG tablet TAKE ONE TABLET BY MOUTH DAILY AS NEEDED FOR ALLERGIES 30 tablet 11    sertraline (ZOLOFT) 50 MG tablet TAKE ONE TABLET BY MOUTH DAILY 30 tablet 4    EMGALITY 120 MG/ML SOSY       ondansetron (ZOFRAN) 4 MG tablet Take 1 tablet by mouth every 8 hours as needed for Nausea or Vomiting 20 tablet 5    promethazine (PHENERGAN) 12.5 MG tablet Take 1 tablet by mouth every 8 hours as needed for Nausea 20 tablet 5    Multiple Vitamins-Minerals (THERAPEUTIC MULTIVITAMIN-MINERALS) tablet Take 1 tablet by mouth daily      RIBOFLAVIN PO Take by mouth      MAGNESIUM PO Take 1 tablet by mouth daily       triamcinolone (KENALOG) 0.1 % cream Apply topically 2 times daily. (Patient not taking: Reported on 3/14/2023) 45 g 0     No current facility-administered medications for this visit. Patient's past medical history, surgical history, family history, medications,  andallergies  were all reviewed and updated as appropriate today. Review of Systems   Constitutional:  Negative for fever. Gastrointestinal:  Positive for diarrhea. Negative for blood in stool, nausea and vomiting. Abdominal pain: stomach cramping. Bad gas        Physical Exam  Vitals and nursing note reviewed. Constitutional:       Appearance: Normal appearance. She is well-developed. HENT:      Head: Normocephalic and atraumatic.       Right Ear: External ear normal.      Left Ear: External ear normal.      Nose: Nose normal.

## 2023-06-24 LAB — H PYLORI BREATH TEST: NEGATIVE

## 2023-06-26 ENCOUNTER — HOSPITAL ENCOUNTER (OUTPATIENT)
Age: 43
Setting detail: SPECIMEN
Discharge: HOME | End: 2023-06-26
Payer: COMMERCIAL

## 2023-06-26 DIAGNOSIS — R19.7 DIARRHEA, UNSPECIFIED TYPE: ICD-10-CM

## 2023-06-26 LAB — C DIFF TOX A+B STL QL IA: NORMAL

## 2023-06-26 PROCEDURE — 87449 NOS EACH ORGANISM AG IA: CPT

## 2023-06-26 PROCEDURE — 87506 IADNA-DNA/RNA PROBE TQ 6-11: CPT

## 2023-06-26 PROCEDURE — 87324 CLOSTRIDIUM AG IA: CPT

## 2023-06-27 LAB — GI PATHOGENS PNL STL NAA+PROBE: NORMAL

## 2023-07-31 RX ORDER — GALCANEZUMAB 120 MG/ML
INJECTION, SOLUTION SUBCUTANEOUS
Qty: 1 ML | Refills: 2 | Status: SHIPPED | OUTPATIENT
Start: 2023-07-31

## 2023-07-31 NOTE — TELEPHONE ENCOUNTER
Last Office Visit  -  6/22/23  Next Office Visit  -  none    Last Filled  -    Last UDS -    Contract -

## 2023-09-01 DIAGNOSIS — G43.109 MIGRAINE WITH AURA AND WITHOUT STATUS MIGRAINOSUS, NOT INTRACTABLE: ICD-10-CM

## 2023-09-01 DIAGNOSIS — F41.9 ANXIETY: ICD-10-CM

## 2023-09-03 RX ORDER — PROMETHAZINE HYDROCHLORIDE 12.5 MG/1
12.5 TABLET ORAL EVERY 8 HOURS PRN
Qty: 20 TABLET | Refills: 5 | Status: SHIPPED | OUTPATIENT
Start: 2023-09-03

## 2023-09-03 RX ORDER — ONDANSETRON 4 MG/1
4 TABLET, FILM COATED ORAL EVERY 8 HOURS PRN
Qty: 20 TABLET | Refills: 5 | Status: SHIPPED | OUTPATIENT
Start: 2023-09-03

## 2023-09-07 ENCOUNTER — PATIENT MESSAGE (OUTPATIENT)
Dept: FAMILY MEDICINE CLINIC | Age: 43
End: 2023-09-07

## 2023-09-07 DIAGNOSIS — G43.909 MIGRAINE WITHOUT STATUS MIGRAINOSUS, NOT INTRACTABLE, UNSPECIFIED MIGRAINE TYPE: Primary | ICD-10-CM

## 2023-09-07 NOTE — TELEPHONE ENCOUNTER
From: Doreen Carroll  To: Lanny Adjutant  Sent: 9/7/2023 1:34 PM EDT  Subject: Need new referral    Trying to get into Neurologist about my migraines - current migraine med is not as good as past one. Hoping that insurance will see this as a next step and allow for the change in prescription.    Referral to [facility] Dr. Linda Torres     Thanks

## 2023-09-12 ENCOUNTER — TELEPHONE (OUTPATIENT)
Dept: FAMILY MEDICINE CLINIC | Age: 43
End: 2023-09-12

## 2023-09-12 ENCOUNTER — PATIENT MESSAGE (OUTPATIENT)
Dept: FAMILY MEDICINE CLINIC | Age: 43
End: 2023-09-12

## 2023-09-12 DIAGNOSIS — G43.909 MIGRAINE WITHOUT STATUS MIGRAINOSUS, NOT INTRACTABLE, UNSPECIFIED MIGRAINE TYPE: ICD-10-CM

## 2023-09-12 NOTE — TELEPHONE ENCOUNTER
[address] [facility] [facility] [address] [facility] (supporting Ella French, APRN - CNP)        A better number gets you directly to the insurance pharmacy is [phone] and fax is [address] [facility] [address] [facility] ([address], APRN - CNP)       Devon Regina needs prior Written authorization for emgality every 3 months to continue it. The number insurance gave me was [phone].

## 2023-09-15 NOTE — TELEPHONE ENCOUNTER
Submitted PA for [facility]  [facility]ia [facility]  (Key: [id number]) STATUS: PENDING. Follow up done daily; if no response in three days we will refax for status check. If another three days goes by with no response we will call the insurance for status.

## 2023-09-18 RX ORDER — GALCANEZUMAB 120 MG/ML
INJECTION, SOLUTION SUBCUTANEOUS
Qty: 1 ML | Refills: 2 | Status: CANCELLED | OUTPATIENT
Start: 2023-09-18

## 2023-09-18 NOTE — TELEPHONE ENCOUNTER
The medication is APPROVED FROM 09/15/23 THRU 09/15/24    If this requires a response please respond to the pool ( P [facility] [address]). Thank you please advise patient. Uncontrolled DM and HLD  Assess compliance and Update MAR with patient before any further changes are made.  Schedule patient for 3 mo f/u for their uncontrolled DM with me.

## 2023-09-18 NOTE — TELEPHONE ENCOUNTER
Patient and [employer] requesting RX be RE-Sent with Correct GPI Code number ending in 0D520.  [employer] stating this GPI Code has an incorrect code and is getting rejected.     EMGALITY 120 MG/ [facility] [id number] - [id number], [address] [place] [address] N - F [phone]

## 2023-09-20 NOTE — TELEPHONE ENCOUNTER
Rx does need a PA- pharmacy state that when they run the PA , the approval or denial status cannot be found. - sent message to PA in separate telephone encounter.

## 2023-09-20 NOTE — TELEPHONE ENCOUNTER
Please let patient know the hold up is the insurance company.  Let her know we have reached out to our PA department to help get this resolved

## 2023-09-20 NOTE — TELEPHONE ENCOUNTER
Pt called to say that the Prior Auth was for the pen injector and the RX  was written for the syringe. Pt stated she thinks this is what the problem is. Pt stated she has been using the syringe and wants to stay with the syringe. Pt stated the phone number for the Prior Auth Dept for Donny [facility] is [phone] and that the \"provider can call that number and get this all straightened out. \"

## 2023-09-20 NOTE — TELEPHONE ENCOUNTER
I gave this information to my supervisor as I was very confused and she states this is a pharmacy issue not a PA issue.

## 2023-09-20 NOTE — TELEPHONE ENCOUNTER
Patient and [employer] requesting RX be RE-Sent with Correct GPI Code number ending in 0D520.  [employer] stating this GPI Code has an incorrect code and is getting rejected. [facility] [address] [id number] [place] - F [phone]     Received this message in telephone encounter from 9/18/23- Can you help out with this? Pharmacy states that they cannot find the approval or denial status of this rx- was there an approval letter?

## 2023-09-20 NOTE — TELEPHONE ENCOUNTER
Patient contacted the office she is still waiting on the status of this medication Emgality, its has been 2 weeks now and patient has had migraines with no medication. Patient would like a return phone call today.  Please advise [phone]

## 2023-09-20 NOTE — TELEPHONE ENCOUNTER
Spoke with the pharmacy and they are unclear what a GPI number is. They state when they have more people they can call the insurance and see what exactly this is. Not sure if you know exactly what the GPI number is.

## 2023-09-21 NOTE — TELEPHONE ENCOUNTER
That I understand. Although a few messages below it says the emgality was approved. I don't see a letter in patients chart that I could email to the pharmacy.

## 2023-09-21 NOTE — TELEPHONE ENCOUNTER
Tried calling pt to see if she was wanting injectables or syringes as that may have been the issue. Although, injectables were approved now, but letter is from [facility] [address], uncertain that if that PA is coming from there, will patient need to have this sent to [facility] or Olga?

## 2023-09-21 NOTE — TELEPHONE ENCOUNTER
Pt is willing to take the risk with trying the injectables for now. States Olga told her it will be $80 when before it was much cheaper. Although patients states before she would always use the syringes. Pt would like for the syringe order to be placed and PA started on it after she gets the injectables so she can go back to what she was previously doing.

## 2023-09-21 NOTE — TELEPHONE ENCOUNTER
The auto injector is what was approved. Trying to switch to syringes may delay starting the medication.  I do not know if the syringes would be approved

## 2023-09-21 NOTE — TELEPHONE ENCOUNTER
9/21/23-- Pt called back stating she prefers the  Syringes. --Pt asking for Update by end of day please.

## 2023-09-22 RX ORDER — GALCANEZUMAB 120 MG/ML
INJECTION, SOLUTION SUBCUTANEOUS
Qty: 4 EACH | Refills: 3 | Status: SHIPPED | OUTPATIENT
Start: 2023-09-22

## 2023-10-12 ENCOUNTER — OFFICE VISIT (OUTPATIENT)
Dept: NEUROLOGY | Age: 43
End: 2023-10-12
Payer: COMMERCIAL

## 2023-10-12 VITALS
SYSTOLIC BLOOD PRESSURE: 122 MMHG | HEIGHT: 63 IN | HEART RATE: 94 BPM | DIASTOLIC BLOOD PRESSURE: 73 MMHG | BODY MASS INDEX: 26.38 KG/M2 | OXYGEN SATURATION: 99 % | WEIGHT: 148.9 LBS

## 2023-10-12 DIAGNOSIS — F34.1 DYSTHYMIA: ICD-10-CM

## 2023-10-12 DIAGNOSIS — G43.711 INTRACTABLE CHRONIC MIGRAINE WITHOUT AURA AND WITH STATUS MIGRAINOSUS: Primary | ICD-10-CM

## 2023-10-12 PROCEDURE — 99204 OFFICE O/P NEW MOD 45 MIN: CPT | Performed by: PSYCHIATRY & NEUROLOGY

## 2023-10-12 RX ORDER — ERENUMAB-AOOE 140 MG/ML
140 INJECTION, SOLUTION SUBCUTANEOUS
Qty: 1 ML | Refills: 2 | Status: SHIPPED | OUTPATIENT
Start: 2023-10-12 | End: 2024-01-10

## 2023-10-12 RX ORDER — RIZATRIPTAN BENZOATE 10 MG/1
10 TABLET, ORALLY DISINTEGRATING ORAL PRN
Qty: 9 TABLET | Refills: 2 | Status: SHIPPED | OUTPATIENT
Start: 2023-10-12

## 2023-10-12 NOTE — PROGRESS NOTES
The patient is a 37y.o. years old female who  was referred by GISELA Longo CNP  for consultation regarding chronic migraines. HPI:  The patient describes history of chronic migraine for years. Description of her migraine is unilateral pulsating pain with nausea, photophobia and phonophobia with visual aura. Degree can be severe and duration is few hours. Frequency once or twice a week. Triggered by stress or weather changes. She feels migraines can affect her quality of life. Occasional daily headaches between her migraines. She tried Fritz Brody in the past which did help her migraine. For insurance purposes, she switched to [facility] and since that time, she feels her migraines are not controlled with Emgality. Her pain is frequent weekly and affecting her quality of life. She tried OTC without improvement. She tried Imitrex and Maxalt in the past.  She is currently on Phenergan as needed for her migraine with nausea. She takes Zoloft. She does have chronic depression. No recent falling or injury. No weakness or numbness or tingling. Other review of system was unremarkable. ROS : A 10-14 system review of constitutional, cardiovascular, respiratory, GI, eyes, , ENT, musculoskeletal, endocrine, skin, SHEENT, genitourinary, psychiatric and neurologic systems was obtained and updated today and is unremarkable except as mentioned in my HPI        Exam:   Constitutional:   Vitals:    10/12/23 1051   BP: 122/73   Site: Left Wrist   Position: Sitting   Pulse: 94   SpO2: 99%   Weight: 148 lb 14.4 oz (67.5 kg)   Height: 5' 3\" (1.6 m)       General appearance:  Normal development and appear in no acute distress. Mental Status:   Oriented to person, place, problem, and time. Memory: Good immediate recall. Intact remote memory  Normal attention span and concentration. Language: intact naming, repeating and fluency   Good fund of Knowledge.  Aware of current events and vocabulary

## 2023-10-16 ENCOUNTER — TELEPHONE (OUTPATIENT)
Dept: NEUROLOGY | Age: 43
End: 2023-10-16

## 2023-10-16 DIAGNOSIS — G43.711 INTRACTABLE CHRONIC MIGRAINE WITHOUT AURA AND WITH STATUS MIGRAINOSUS: Primary | ICD-10-CM

## 2023-10-16 NOTE — TELEPHONE ENCOUNTER
Received denial for Aimovig x2    Submitted information that patient tried imitrex, maxalt, sertraline, and emgality  Medication not effective     Formulary alternatives are:   Mayito Lyon    Requirements are 3 or more alternatives, 2 in a class with 2 alternatives or 1 in class 1 alternative    (See attatched)    Please advise on the next step.

## 2023-12-12 ENCOUNTER — TELEPHONE (OUTPATIENT)
Dept: FAMILY MEDICINE CLINIC | Age: 43
End: 2023-12-12

## 2023-12-12 NOTE — TELEPHONE ENCOUNTER
Pt contacted office and she was wanting appt for today. She is having cough, congestion started mo ago. Pt was offered multiple different times for vv and refused. Per JOSE Meng she needs to go to [facility] so they can swab her. Pt was at work and she was not able to come to office for swab. Pt going to [facility].

## 2023-12-28 ENCOUNTER — TELEPHONE (OUTPATIENT)
Dept: NEUROLOGY | Age: 43
End: 2023-12-28

## 2024-01-17 DIAGNOSIS — F41.9 ANXIETY: ICD-10-CM

## 2024-01-17 NOTE — TELEPHONE ENCOUNTER
Last Office Visit  -  6/22/23  Next Office Visit  -      Last Filled  -    Last UDS -    Contract -

## 2024-01-30 ENCOUNTER — TELEPHONE (OUTPATIENT)
Dept: NEUROLOGY | Age: 44
End: 2024-01-30

## 2024-01-30 ENCOUNTER — PATIENT MESSAGE (OUTPATIENT)
Dept: NEUROLOGY | Age: 44
End: 2024-01-30

## 2024-01-30 NOTE — TELEPHONE ENCOUNTER
This was DENIED in encounter 12/5/2023 by [employer]. Documentation in in MEDIA.    Are there medications that have been tried/failed since that PA submission?    If this requires a response please respond to the pool ( P MHCX PSC MEDICATION PRE-AUTH).      Thank you.

## 2024-01-31 NOTE — TELEPHONE ENCOUNTER
Submitted PA for [facility] Key: [id number] STATUS: PENDING.    Follow up done daily; if no decision with in three days we will refax.  If another three days goes by with no decision will call the insurance for status.

## 2024-01-31 NOTE — TELEPHONE ENCOUNTER
Please resubmit:     Pt has tried/failed the following:    Toradol 4/2016 - 6/2016 (migraine injection)  Riboflavin OTC for Migraine  Magnesium OTC for migraine  NSAIDS 4/6/2016 - 7/8/2020 OTC for migraine  Lexapro 2/20/18 - 6/2/20 (antidepressant)  Sertraline 6/12/2020 - current (antidepressant)  Wellbutrin (antidepressant)  Prozac (antidepressant)  Aimovig 70mg 10/26/2018 - 7/26/2022 (previously approved)  Aimovig 140mg 10/12/23 - 12/19/23 (previously approved)  Emgality 1/25/22 - 7/30/23 (previously approved)  Maxalt 7/15/16 - 10/12/23  Sumatriptan different provider      She is currently on Ajovy 225 mg monthly which did help her migraine

## 2024-02-01 NOTE — TELEPHONE ENCOUNTER
Additional questions FAXED successfully, with both chart notes attached.     Documentation uploaded in MEDIA.

## 2024-02-01 NOTE — TELEPHONE ENCOUNTER
The medication is APPROVED.  2/1/2024-2/1/2025.    If this requires a response please respond to the pool ( P MHCX PSC MEDICATION PRE-AUTH).      Thank you please advise patient.

## 2024-02-25 DIAGNOSIS — F41.9 ANXIETY: ICD-10-CM

## 2024-05-12 DIAGNOSIS — Z91.09 ENVIRONMENTAL ALLERGIES: ICD-10-CM

## 2024-05-13 RX ORDER — MONTELUKAST SODIUM 10 MG/1
TABLET ORAL
Qty: 30 TABLET | Refills: 11 | Status: SHIPPED | OUTPATIENT
Start: 2024-05-13

## 2024-05-27 ASSESSMENT — PATIENT HEALTH QUESTIONNAIRE - PHQ9
10. IF YOU CHECKED OFF ANY PROBLEMS, HOW DIFFICULT HAVE THESE PROBLEMS MADE IT FOR YOU TO DO YOUR WORK, TAKE CARE OF THINGS AT HOME, OR GET ALONG WITH OTHER PEOPLE: SOMEWHAT DIFFICULT
4. FEELING TIRED OR HAVING LITTLE ENERGY: SEVERAL DAYS
6. FEELING BAD ABOUT YOURSELF - OR THAT YOU ARE A FAILURE OR HAVE LET YOURSELF OR YOUR FAMILY DOWN: NOT AT ALL
7. TROUBLE CONCENTRATING ON THINGS, SUCH AS READING THE NEWSPAPER OR WATCHING TELEVISION: SEVERAL DAYS
8. MOVING OR SPEAKING SO SLOWLY THAT OTHER PEOPLE COULD HAVE NOTICED. OR THE OPPOSITE - BEING SO FIDGETY OR RESTLESS THAT YOU HAVE BEEN MOVING AROUND A LOT MORE THAN USUAL: SEVERAL DAYS
9. THOUGHTS THAT YOU WOULD BE BETTER OFF DEAD, OR OF HURTING YOURSELF: NOT AT ALL
SUM OF ALL RESPONSES TO PHQ QUESTIONS 1-9: 5
SUM OF ALL RESPONSES TO PHQ QUESTIONS 1-9: 5
9. THOUGHTS THAT YOU WOULD BE BETTER OFF DEAD, OR OF HURTING YOURSELF: NOT AT ALL
SUM OF ALL RESPONSES TO PHQ QUESTIONS 1-9: 5
5. POOR APPETITE OR OVEREATING: NOT AT ALL
8. MOVING OR SPEAKING SO SLOWLY THAT OTHER PEOPLE COULD HAVE NOTICED. OR THE OPPOSITE, BEING SO FIGETY OR RESTLESS THAT YOU HAVE BEEN MOVING AROUND A LOT MORE THAN USUAL: SEVERAL DAYS
7. TROUBLE CONCENTRATING ON THINGS, SUCH AS READING THE NEWSPAPER OR WATCHING TELEVISION: SEVERAL DAYS
SUM OF ALL RESPONSES TO PHQ9 QUESTIONS 1 & 2: 1
SUM OF ALL RESPONSES TO PHQ QUESTIONS 1-9: 5
2. FEELING DOWN, DEPRESSED OR HOPELESS: NOT AT ALL
3. TROUBLE FALLING OR STAYING ASLEEP: SEVERAL DAYS
1. LITTLE INTEREST OR PLEASURE IN DOING THINGS: SEVERAL DAYS
4. FEELING TIRED OR HAVING LITTLE ENERGY: SEVERAL DAYS
SUM OF ALL RESPONSES TO PHQ QUESTIONS 1-9: 5
1. LITTLE INTEREST OR PLEASURE IN DOING THINGS: SEVERAL DAYS
3. TROUBLE FALLING OR STAYING ASLEEP: SEVERAL DAYS
10. IF YOU CHECKED OFF ANY PROBLEMS, HOW DIFFICULT HAVE THESE PROBLEMS MADE IT FOR YOU TO DO YOUR WORK, TAKE CARE OF THINGS AT HOME, OR GET ALONG WITH OTHER PEOPLE: SOMEWHAT DIFFICULT
6. FEELING BAD ABOUT YOURSELF - OR THAT YOU ARE A FAILURE OR HAVE LET YOURSELF OR YOUR FAMILY DOWN: NOT AT ALL
5. POOR APPETITE OR OVEREATING: NOT AT ALL
2. FEELING DOWN, DEPRESSED OR HOPELESS: NOT AT ALL

## 2024-05-28 ENCOUNTER — OFFICE VISIT (OUTPATIENT)
Dept: FAMILY MEDICINE CLINIC | Age: 44
End: 2024-05-28
Payer: COMMERCIAL

## 2024-05-28 VITALS
HEART RATE: 72 BPM | DIASTOLIC BLOOD PRESSURE: 78 MMHG | WEIGHT: 156 LBS | SYSTOLIC BLOOD PRESSURE: 110 MMHG | BODY MASS INDEX: 25.99 KG/M2 | OXYGEN SATURATION: 98 % | HEIGHT: 65 IN

## 2024-05-28 DIAGNOSIS — Z00.00 PHYSICAL EXAM: Primary | ICD-10-CM

## 2024-05-28 DIAGNOSIS — Z00.00 PHYSICAL EXAM: ICD-10-CM

## 2024-05-28 DIAGNOSIS — E78.00 HIGH CHOLESTEROL: ICD-10-CM

## 2024-05-28 LAB
ALBUMIN SERPL-MCNC: 4.1 G/DL (ref 3.4–5)
ALBUMIN/GLOB SERPL: 1.6 {RATIO} (ref 1.1–2.2)
ALP SERPL-CCNC: 60 U/L (ref 40–129)
ALT SERPL-CCNC: 14 U/L (ref 10–40)
ANION GAP SERPL CALCULATED.3IONS-SCNC: 6 MMOL/L (ref 3–16)
AST SERPL-CCNC: 17 U/L (ref 15–37)
BASOPHILS # BLD: 0 K/UL (ref 0–0.2)
BASOPHILS NFR BLD: 0.6 %
BILIRUB SERPL-MCNC: <0.2 MG/DL (ref 0–1)
BUN SERPL-MCNC: 11 MG/DL (ref 7–20)
CALCIUM SERPL-MCNC: 9.1 MG/DL (ref 8.3–10.6)
CHLORIDE SERPL-SCNC: 102 MMOL/L (ref 99–110)
CHOLEST SERPL-MCNC: 188 MG/DL (ref 0–199)
CO2 SERPL-SCNC: 30 MMOL/L (ref 21–32)
CREAT SERPL-MCNC: 0.7 MG/DL (ref 0.6–1.1)
DEPRECATED RDW RBC AUTO: 13.5 % (ref 12.4–15.4)
EOSINOPHIL # BLD: 0.1 K/UL (ref 0–0.6)
EOSINOPHIL NFR BLD: 1.7 %
GFR SERPLBLD CREATININE-BSD FMLA CKD-EPI: >90 ML/MIN/{1.73_M2}
GLUCOSE SERPL-MCNC: 77 MG/DL (ref 70–99)
HCT VFR BLD AUTO: 38.6 % (ref 36–48)
HDLC SERPL-MCNC: 46 MG/DL (ref 40–60)
HGB BLD-MCNC: 13 G/DL (ref 12–16)
LDLC SERPL CALC-MCNC: 116 MG/DL
LYMPHOCYTES # BLD: 1.8 K/UL (ref 1–5.1)
LYMPHOCYTES NFR BLD: 25.9 %
MCH RBC QN AUTO: 28.8 PG (ref 26–34)
MCHC RBC AUTO-ENTMCNC: 33.6 G/DL (ref 31–36)
MCV RBC AUTO: 85.5 FL (ref 80–100)
MONOCYTES # BLD: 0.4 K/UL (ref 0–1.3)
MONOCYTES NFR BLD: 6.4 %
NEUTROPHILS # BLD: 4.6 K/UL (ref 1.7–7.7)
NEUTROPHILS NFR BLD: 65.4 %
PLATELET # BLD AUTO: 284 K/UL (ref 135–450)
PMV BLD AUTO: 8.2 FL (ref 5–10.5)
POTASSIUM SERPL-SCNC: 4.3 MMOL/L (ref 3.5–5.1)
PROT SERPL-MCNC: 6.7 G/DL (ref 6.4–8.2)
RBC # BLD AUTO: 4.51 M/UL (ref 4–5.2)
SODIUM SERPL-SCNC: 138 MMOL/L (ref 136–145)
TRIGL SERPL-MCNC: 128 MG/DL (ref 0–150)
VLDLC SERPL CALC-MCNC: 26 MG/DL
WBC # BLD AUTO: 7 K/UL (ref 4–11)

## 2024-05-28 PROCEDURE — 99396 PREV VISIT EST AGE 40-64: CPT | Performed by: NURSE PRACTITIONER

## 2024-05-28 RX ORDER — AMOXICILLIN AND CLAVULANATE POTASSIUM 875; 125 MG/1; MG/1
TABLET, FILM COATED ORAL
COMMUNITY
Start: 2024-05-23

## 2024-05-28 ASSESSMENT — ENCOUNTER SYMPTOMS
GASTROINTESTINAL NEGATIVE: 1
SORE THROAT: 0
SHORTNESS OF BREATH: 0
WHEEZING: 0
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
## 2024-07-22 NOTE — PATIENT INSTRUCTIONS
Detail Level: Detailed it down, leading to dry mouth and an upset stomach. Since excess weight isnt needed in times of acute danger, the body may eliminate the lower digestive track, which causes diarrhea. Blurred Vision, Derealization, Depersonalization: It is common for our pupils to dilate during times of danger. Although this improves night vision by increasing the amount of light that can enter the eye, it can also lead to blurred or brighter vision during the day. These changes in visual perception, when combined with the other unusual physical sensations mentioned above, can contribute to feelings of unreality, such as derealization and depersonalization. When this alarm system activates the brain automatically takes in, like a flashbulb camera, everything that can be taken in by the five senses (sights, sounds, smells, tastes, and touch). The goal is to keep us alive in the future. If I can remember everything about this situation then I can prevent myself from ever being in a similar situation. While the goal is survival, a natural byproduct is that the brain takes in EVERYTHING which means that certain things about our environment that were once neutral (i.e., the smell of car exhaust, a crowded restaurant, etc.) now have a negative association. For example, in a classic scientific experiment, scientists were measuring the salivation of dogs to the presentation of dog food. In the process of measuring the salivation of dogs they found, unexpectedly that dogs began to salivate not only when food was present, but at the site of researchers in white coats and the ring of bell caused by the opening of a door. It was concluded that the dogs began to associate these previously neutral stimuli with getting food and, as a result, they would salivate if these were present even without the presence of food. WHY WONT IT GO AWAY? Avoidance is the key.   If you stay away from those things that illicit the fear response your brain gets to maintain that it is dangerous and if you leave a situation because it illicits the fear response your brain convinces you that without leaving you would have never survived. If we never got back on the bike after we fell for the first time that would be our last memory of riding bikes and none of us would be riding bikes today. WHAT DO I DO NOW? The answer is simple, but the act is difficult. We have to go towards those things our brain tells us we must avoid. The goal of in-vivo exposure is to relearn through gradual exposure the followin. These uncomfortable feelings will decrease with time. I do not have to leave or avoid them all together as they will decrease. 2. There is nothing to be afraid of. You will see that the things you have been avoiding are not inherently dangerous, but you cannot learn that without exposure. 3. I can do it. No longer do your actions have to be dictated by anxiety or fear. Personal Thought  Control. Our thinking often creates anxiety for us. Getting better control of our thinking can go a long way in helping us cope. The following steps can be useful. 1. Let yourself become aware of thoughts you have when you are anxious. What are the words that you are saying to yourself at that moment? Sometimes it takes a little practice before we become aware of our thoughts. Some examples might be:  I know something bad is going to happen, or This is horrible or Velvet Binning is this happening to me!?  2. Write your thoughts down. Its much easier to work with our thoughts, analyze them, and replace them if they are in black and white.   3. Ask yourself the following questions about your thoughts:  a. Is it true? (Is it logically correct? Where is the evidence to support the truth of that thought? Are there alternative ways of thinking that would be more correct?).   If a thought is not as true as it could be, replace it with a more realistic and helpful one. The majority of thoughts we have that generate anxiety are not the most realistic appraisals of the situation. b. So what? (If this is logically correct, what does it mean to me? Is there anything I can do about the situation? Is it in my best interest to get anxious about this?). 4. Use coping self-statements. When feeling anxious, you may be able to tell yourself automatic phrases without thinking too much about it. A couple of examples would be phrases such as Its OK, I can handle it, or Ive been through things like this before and have done all right.   Notice that these statements tend to be true for all of us. 5. Notice a change in your emotional state as you change your thinking. As your thoughts become more realistic, you will probably notice a decrease in anxiety and tension, and an increase in your ability to cope. The Stress Response and How It Can Affect You   The stress response, or fight or flight response is the emergency reaction system of the body. It is there to keep you safe in emergencies. The stress response includes physical and thought responses to your perception of various situations. When the stress response is turned on, your body may release substances like adrenaline and cortisol. Your organs are programmed to respond in certain ways to situations that are viewed as challenging or threatening. The stress response can work against you. You can turn it on when you dont really need it and, as a result, perceive something as an emergency when its really not. It can turn on when you are just thinking about past or future events. Harmless, chronic conditions can be intensified by the stress response activating too often, with too much intensity, or for too long. Stress responses can be different for different individuals. Below is a list of some common stress related responses people have.  ([place] the responses you have had in the

## 2024-07-25 DIAGNOSIS — F41.9 ANXIETY: ICD-10-CM

## 2024-07-25 NOTE — TELEPHONE ENCOUNTER
[facility] is requesting a 90 day rx for     sertraline (ZOLOFT) 50 MG tablet       Last OV 5/28/2024    Future OV Visit date not found

## 2024-08-30 ENCOUNTER — OFFICE VISIT (OUTPATIENT)
Dept: NEUROLOGY | Age: 44
End: 2024-08-30
Payer: COMMERCIAL

## 2024-08-30 ENCOUNTER — TELEPHONE (OUTPATIENT)
Dept: ADMINISTRATIVE | Age: 44
End: 2024-08-30

## 2024-08-30 VITALS
HEIGHT: 64 IN | BODY MASS INDEX: 26.56 KG/M2 | OXYGEN SATURATION: 97 % | SYSTOLIC BLOOD PRESSURE: 115 MMHG | DIASTOLIC BLOOD PRESSURE: 55 MMHG | HEART RATE: 89 BPM | WEIGHT: 155.6 LBS

## 2024-08-30 DIAGNOSIS — G47.19 EXCESSIVE DAYTIME SLEEPINESS: ICD-10-CM

## 2024-08-30 DIAGNOSIS — R06.83 LOUD SNORING: ICD-10-CM

## 2024-08-30 DIAGNOSIS — G43.109 MIGRAINE WITH AURA AND WITHOUT STATUS MIGRAINOSUS, NOT INTRACTABLE: Primary | ICD-10-CM

## 2024-08-30 PROCEDURE — 99215 OFFICE O/P EST HI 40 MIN: CPT | Performed by: STUDENT IN AN ORGANIZED HEALTH CARE EDUCATION/TRAINING PROGRAM

## 2024-08-30 RX ORDER — PROMETHAZINE HYDROCHLORIDE 12.5 MG/1
12.5 TABLET ORAL EVERY 8 HOURS PRN
Qty: 20 TABLET | Refills: 5 | Status: SHIPPED | OUTPATIENT
Start: 2024-08-30

## 2024-08-30 RX ORDER — ONDANSETRON 4 MG/1
4 TABLET, FILM COATED ORAL EVERY 8 HOURS PRN
Qty: 20 TABLET | Refills: 5 | Status: SHIPPED | OUTPATIENT
Start: 2024-08-30

## 2024-08-30 NOTE — PATIENT INSTRUCTIONS
Gradually reduce and then stop sertraline 50mg tablet.   Day 1-3: take 1/2 tablet in the morning  Day 4: stop  Wait 1 week before starting amitriptyline and follow titration instructions on the bottle.     ===    Here are some behavioral and dietary changes you can make to improve your headaches:    Gradually reduce and ideally stop consuming caffeine. Caffeine use 3 or more days per week is likely to worsen your headache.    Exercise regularly. Try starting out with 30 minutes of aerobic exercise 3 times per week.    Make time to pursue activities that make you feel relaxed and happy. Try to spend at least 20 minutes outdoors (weather permitting). Consider pursuing artistic activities to explore your creative side. Try doing process-driven activities such as cooking and baking.    Make changes to improve your sleep at night (see below).    Take note of any food or drink items that seem to trigger your headaches within 12-24 hours after consuming the dietary item. Some common (but not universal) dietary triggers are listed below. If you identify a dietary trigger, then you may consider limiting the dietary item for 4 weeks then monitor your headache frequency, intensity, and response for rescue medications by using a headache diary. If there is no change to your headache, then the dietary item alone is unlikely to be a significant trigger for your headache.      Common (but not universal) dietary triggers for headache:    Alcohol, especially red wine   Aspartame sweetener   Beans and other tyramine-containing foods   Caffeine   Cheese   Yogurt   Food containing MSG    Processed meat containing sulfites   Vitamins and herbal supplements containing caffeine    Try keeping a headache diary to track headache frequency, intensity, and response to rescue therapy. There are many smartphone apps that can be used for this purpose or you can use a handwritten journal. Keeping track of the mild headache days per month, severe  headache days per month, and rescue medication use days per week will be helpful to monitor response to treatment.     Behavioral changes you can make to improve your sleep at night:   Be consistent; go to bed at the same time each night and get up at the same time each morning, including on the weekends    Make sure your bedroom is quiet, dark, relaxing, and at a comfortable temperature    Remove electronic devices, such as televisions, computers, and smartphones, from the bedroom    Avoid large meals, caffeine, and alcohol before bedtime   Get some exercise; being physically active during the day can help you fall asleep more easily at night     Online Resources:  <www.americanmigrainefoundation.org/> The patient guide section is a great resource to learn more about headache.

## 2024-08-30 NOTE — PROGRESS NOTES
Danna Linder (:  1980) is a 44 y.o. female,Established patient, here for evaluation of the following chief complaint(s):  Follow-up (Former [place] [facility], migraines)      Assessment & Plan   1. Migraine with aura and without status migrainosus, not intractable  Assessment & Plan:   Uncontrolled, changes made today: stop Ajovy, stop sertraline 50mg daily, start and titrate amitriptyline to 50mg QHS, stop Maxalt prn, start rimegepant 75mg ODT prn, increase ondansetron from 4mg prn to 8mg prn. HA frequency 5-6 headache days per month with Ajovy but has ongoing issues with insurance coverage. I suspect the insurance coverage issues are because she has never tried a first line medication for migraine prophylaxis. She recently took a dose of Ajovy so will not continue. Currently using low dose sertraline for anxiety which is well controlled and has insomnia so replacing sertraline with amitriptyline in hopes of addressing migraine, insomnia, and anxiety. She has tried and failed two triptans so I am escalating abortive treatment to rimegepant. Ondansetron 4mg is only partially effective so increasing to 8mg prn. She also has excessive daytime sleepiness and loud snoring suspicious for untreated sleep apnea that could be exacerbating her headaches.  Orders:  -     Jasson Mcbride MD, Sleep Medicine, [facility]  -     amitriptyline (ELAVIL) 25 MG tablet; Take 0.5 tablets by mouth nightly for 7 days, THEN 1 tablet nightly for 7 days, THEN 2 tablets nightly., Disp-60 tablet, R-11Normal  -     rimegepant sulfate 75 MG TBDP; Take 75 mg by mouth as needed (migraine) Maximum: 75 mg per 24 hours, Disp-8 tablet, R-11Normal  -     ondansetron (ZOFRAN) 4 MG tablet; Take 1 tablet by mouth every 8 hours as needed for Nausea or Vomiting, Disp-20 tablet, R-5Normal  -     promethazine (PHENERGAN) 12.5 MG tablet; Take 1 tablet by mouth every 8 hours as needed for Nausea, Disp-20 tablet, R-5Normal  2. Loud snoring  -      [facility]  3. Excessive daytime sleepiness  -     Jasson Mcbride MD, Sleep Medicine, [facility]      Return in about 3 months (around 11/30/2024).       Subjective   Follow up for migraine treated with Ajovy 225mg/mo. No med changes last visit.     Poor historian. There were insurance coverage issues with Ajovy but eventually her insurance did cover it. However, she thinks it is not working well enough. She experiences 5-6 headache days per month. She did not always have Maxalt on her (e.g. when traveling) so had to resort to OTC remedies which are only partially effective. She finds that Maxalt is only partially effective. She suffered a week of cluster headaches recently. If not Maxalt she uses ibuprofen. Some headaches are more mild and others are more severe intensity. She has prominent nausea with severe intensity headaches. She has visual aura and dizziness sometimes with headaches. Severe intensity headaches she is unable to work and has to lay down in dark room. Throbbing/pounding quality pain. Pain is unilateral either side. Headache duration is hours. Headaches tend to occur 1-3 days prior to menstrual period but menstrual period is unpredictable. She also uses magnesium and riboflavin daily with modest improvement in headache.     She has sleep fragmentation with 1-2 per night nocturia. Rarely feels well rested upon waking. She feels excessively sleepy during the daytime. She snores loudly at night.     Mood is irritably around menstrual periods. Issues with anxiety better with sertraline.     Treatment trials  Ajovy 225mg/mo ineffective, worse benefit than Emgality, worse headache frequency and intensity than Aimovig.   Emgality ineffective, headache frequency and intensity worsened after switching from Aimovig for insurance coverage issues  Aimovig does not recall dose, headaches were less intense and less frequent compared with Ajovy  Zofran 4mg prn

## 2024-08-30 NOTE — TELEPHONE ENCOUNTER
Submitted PA for [facility] Via [facility] Key: [id number] STATUS: NOT SENT    Please complete Chart Note.    Once complete, respond to the pool ( P MHCX PSC MEDICATION PRE-AUTH).      Thank you.

## 2024-08-31 NOTE — ASSESSMENT & PLAN NOTE
Uncontrolled, changes made today: stop Ajovy, stop sertraline 50mg daily, start and titrate amitriptyline to 50mg QHS, stop Maxalt prn, start rimegepant 75mg ODT prn, increase ondansetron from 4mg prn to 8mg prn. HA frequency 5-6 headache days per month with Ajovy but has ongoing issues with insurance coverage. I suspect the insurance coverage issues are because she has never tried a first line medication for migraine prophylaxis. She recently took a dose of Ajovy so will not continue. Currently using low dose sertraline for anxiety which is well controlled and has insomnia so replacing sertraline with amitriptyline in hopes of addressing migraine, insomnia, and anxiety. She has tried and failed two triptans so I am escalating abortive treatment to rimegepant. Ondansetron 4mg is only partially effective so increasing to 8mg prn. She also has excessive daytime sleepiness and loud snoring suspicious for untreated sleep apnea that could be exacerbating her headaches.

## 2024-09-02 ENCOUNTER — APPOINTMENT (OUTPATIENT)
Dept: GENERAL RADIOLOGY | Age: 44
End: 2024-09-02
Payer: COMMERCIAL

## 2024-09-02 ENCOUNTER — HOSPITAL ENCOUNTER (EMERGENCY)
Age: 44
Discharge: HOME | End: 2024-09-02
Payer: COMMERCIAL

## 2024-09-02 VITALS
TEMPERATURE: 97.8 F | SYSTOLIC BLOOD PRESSURE: 103 MMHG | DIASTOLIC BLOOD PRESSURE: 64 MMHG | BODY MASS INDEX: 26.46 KG/M2 | HEART RATE: 60 BPM | HEIGHT: 64 IN | OXYGEN SATURATION: 97 % | WEIGHT: 155 LBS | RESPIRATION RATE: 16 BRPM

## 2024-09-02 DIAGNOSIS — S61.451A DOG BITE OF RIGHT HAND, INITIAL ENCOUNTER: ICD-10-CM
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
## 2024-09-02 DIAGNOSIS — S61.452A DOG BITE OF LEFT HAND, INITIAL ENCOUNTER: Primary | ICD-10-CM

## 2024-09-02 PROCEDURE — 99283 EMERGENCY DEPT VISIT LOW MDM: CPT

## 2024-09-02 PROCEDURE — 6370000000 HC RX 637 (ALT 250 FOR IP): Performed by: NURSE PRACTITIONER

## 2024-09-02 PROCEDURE — 73130 X-RAY EXAM OF HAND: CPT

## 2024-09-02 RX ADMIN — AMOXICILLIN AND CLAVULANATE POTASSIUM 1 TABLET: 875; 125 TABLET, FILM COATED ORAL at 14:07

## 2024-09-02 ASSESSMENT — PAIN DESCRIPTION - LOCATION: LOCATION: HAND

## 2024-09-02 ASSESSMENT — PAIN SCALES - GENERAL: PAINLEVEL_OUTOF10: 5

## 2024-09-02 ASSESSMENT — PAIN - FUNCTIONAL ASSESSMENT: PAIN_FUNCTIONAL_ASSESSMENT: 0-10

## 2024-09-02 ASSESSMENT — PAIN DESCRIPTION - DESCRIPTORS: DESCRIPTORS: BURNING

## 2024-09-02 ASSESSMENT — PAIN DESCRIPTION - ORIENTATION: ORIENTATION: RIGHT;LEFT
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
## 2024-09-03 NOTE — TELEPHONE ENCOUNTER
Writer spoke with patient she is aware of the approval for [facility]. Will reach out to pharmacy

## 2024-09-03 NOTE — TELEPHONE ENCOUNTER
Submitted PA for [facility] Via [facility] Key: [id number] STATUS: APPROVED.    Auth Expiration Date: 9/3/2025.    If this requires a response please respond to the pool ( P MHCX PSC MEDICATION PRE-AUTH).      Thank you please advise patient.

## 2024-09-27 ENCOUNTER — TELEPHONE (OUTPATIENT)
Dept: NEUROLOGY | Age: 44
End: 2024-09-27

## 2024-09-27 DIAGNOSIS — G47.00 INSOMNIA, UNSPECIFIED TYPE: ICD-10-CM

## 2024-09-27 DIAGNOSIS — F34.1 DYSTHYMIA: ICD-10-CM

## 2024-09-27 DIAGNOSIS — F41.9 ANXIETY: ICD-10-CM

## 2024-09-27 DIAGNOSIS — G43.711 INTRACTABLE CHRONIC MIGRAINE WITHOUT AURA AND WITH STATUS MIGRAINOSUS: Primary | ICD-10-CM

## 2024-09-27 ASSESSMENT — SLEEP AND FATIGUE QUESTIONNAIRES
HOW LIKELY ARE YOU TO NOD OFF OR FALL ASLEEP WHILE LYING DOWN TO REST IN THE AFTERNOON WHEN CIRCUMSTANCES PERMIT: MODERATE CHANCE OF DOZING
HOW LIKELY ARE YOU TO NOD OFF OR FALL ASLEEP WHILE SITTING INACTIVE IN A PUBLIC PLACE: WOULD NEVER DOZE
HOW LIKELY ARE YOU TO NOD OFF OR FALL ASLEEP WHEN YOU ARE A PASSENGER IN A CAR FOR AN HOUR WITHOUT A BREAK: SLIGHT CHANCE OF DOZING
HOW LIKELY ARE YOU TO NOD OFF OR FALL ASLEEP WHILE SITTING AND TALKING TO SOMEONE: WOULD NEVER DOZE
HOW LIKELY ARE YOU TO NOD OFF OR FALL ASLEEP WHILE SITTING QUIETLY AFTER LUNCH WITHOUT ALCOHOL: WOULD NEVER DOZE
HOW LIKELY ARE YOU TO NOD OFF OR FALL ASLEEP WHILE SITTING INACTIVE IN A PUBLIC PLACE: WOULD NEVER DOZE
HOW LIKELY ARE YOU TO NOD OFF OR FALL ASLEEP WHEN YOU ARE A PASSENGER IN A CAR FOR AN HOUR WITHOUT A BREAK: SLIGHT CHANCE OF DOZING
HOW LIKELY ARE YOU TO NOD OFF OR FALL ASLEEP WHILE SITTING AND TALKING TO SOMEONE: WOULD NEVER DOZE
HOW LIKELY ARE YOU TO NOD OFF OR FALL ASLEEP WHILE SITTING AND READING: SLIGHT CHANCE OF DOZING
HOW LIKELY ARE YOU TO NOD OFF OR FALL ASLEEP WHILE WATCHING TV: SLIGHT CHANCE OF DOZING
ESS TOTAL SCORE: 5
HOW LIKELY ARE YOU TO NOD OFF OR FALL ASLEEP IN A CAR, WHILE STOPPED FOR A FEW MINUTES IN TRAFFIC: WOULD NEVER DOZE
HOW LIKELY ARE YOU TO NOD OFF OR FALL ASLEEP WHILE LYING DOWN TO REST IN THE AFTERNOON WHEN CIRCUMSTANCES PERMIT: MODERATE CHANCE OF DOZING
HOW LIKELY ARE YOU TO NOD OFF OR FALL ASLEEP WHILE SITTING QUIETLY AFTER LUNCH WITHOUT ALCOHOL: WOULD NEVER DOZE
HOW LIKELY ARE YOU TO NOD OFF OR FALL ASLEEP IN A CAR, WHILE STOPPED FOR A FEW MINUTES IN TRAFFIC: WOULD NEVER DOZE
HOW LIKELY ARE YOU TO NOD OFF OR FALL ASLEEP WHILE SITTING AND READING: SLIGHT CHANCE OF DOZING
HOW LIKELY ARE YOU TO NOD OFF OR FALL ASLEEP WHILE WATCHING TV: SLIGHT CHANCE OF DOZING

## 2024-09-27 NOTE — TELEPHONE ENCOUNTER
Pt called saying amitriptyline is not helping with migraines or anxiety but is helping her sleep.  Pt asking if she can stop taking it and go back to taking sertraline 50 mg daily for anxiety and can she have something else for migraines?  She was on Ajovy injections previously but it didn't work well.  She's willing to try alternative oral or injectable migraine drug.    Not worried about sleep because she has an appt with sleep specialist on Mon.     Pharmacy: [facility]

## 2024-09-29 PROBLEM — F41.9 ANXIETY: Status: ACTIVE | Noted: 2024-09-29

## 2024-09-29 PROBLEM — G47.00 INSOMNIA: Status: ACTIVE | Noted: 2024-09-29

## 2024-09-29 RX ORDER — AMITRIPTYLINE HYDROCHLORIDE 100 MG/1
100 TABLET ORAL NIGHTLY
Qty: 30 TABLET | Refills: 11 | Status: SHIPPED | OUTPATIENT
Start: 2024-09-29 | End: 2025-09-24

## 2024-09-30 ENCOUNTER — OFFICE VISIT (OUTPATIENT)
Dept: PULMONOLOGY | Age: 44
End: 2024-09-30
Payer: COMMERCIAL

## 2024-09-30 ENCOUNTER — TELEPHONE (OUTPATIENT)
Dept: SLEEP CENTER | Age: 44
End: 2024-09-30

## 2024-09-30 VITALS
SYSTOLIC BLOOD PRESSURE: 117 MMHG | DIASTOLIC BLOOD PRESSURE: 69 MMHG | WEIGHT: 159 LBS | HEART RATE: 85 BPM | OXYGEN SATURATION: 97 % | BODY MASS INDEX: 26.49 KG/M2 | TEMPERATURE: 97.9 F | RESPIRATION RATE: 16 BRPM | HEIGHT: 65 IN

## 2024-09-30 DIAGNOSIS — G47.30 SLEEP APNEA, UNSPECIFIED TYPE: Primary | ICD-10-CM

## 2024-09-30 PROCEDURE — 99243 OFF/OP CNSLTJ NEW/EST LOW 30: CPT | Performed by: INTERNAL MEDICINE

## 2024-09-30 NOTE — TELEPHONE ENCOUNTER
Called patient at [phone] to schedule HST but there was no answer and there was no voicemail.

## 2024-09-30 NOTE — PROGRESS NOTES
MA Communication:  The following orders are received by verbal communication from Jasson Klein MD        Orders include:      Home sleep study will call with results    
needed in Epic.   reports that she has never smoked. She has never used smokeless tobacco.  family history includes Allergic Rhinitis in her mother; Alzheimer's Disease in her maternal grandmother; Cancer in her paternal grandfather and paternal grandmother; Dementia in her maternal grandmother; Diabetes in her maternal grandfather, maternal grandmother, and paternal grandfather; Heart Disease in her maternal grandmother and paternal grandfather; High Blood Pressure in her father; High Cholesterol in her father; Mental Illness in her sister; Migraines in her father, sister, and sister; Other in her mother.    Review of Systems: see HPI and attached review of system sheet.     PHYSICAL EXAM:  Blood pressure 117/69, pulse 85, temperature 97.9 °F (36.6 °C), temperature source Temporal, resp. rate 16, height 1.645 m (5' 4.75\"), weight 72.1 kg (159 lb), last menstrual period 08/27/2024, SpO2 97%, not currently breastfeeding.'   159# 9/30/2024  Constitutional:  No acute distress.    HENT:  Oropharynx is clear and moist. Mallampati class 1.  Eyes: Pupils equal, round. No scleral icterus.   Neck: No tracheal deviation present.  Circumference 12 inches   Cardiovascular: Normal heart sounds.  No lower extremity edema.  Pulmonary/Chest: Clear breath sounds.  No accessory muscle usage.   Musculoskeletal: No cyanosis. No clubbing.  Skin: Skin is warm and dry.   Psychiatric: Normal mood and affect.    DATA:  I personally reviewed outside records and have summarized them as follows: Had new neurology physician recommend sleep testing because of difficult to treat migraines.  She has not had sleep testing in the past.    - Sleep Testing -    - Cardiac Testing -     - Other Testing -   Labs 5/28/2024 hemoglobin 13, creatinine 0.7    ASSESSMENT:  Sleep apnea, unspecified   Snoring  Sleep maintenance insomnia, wakes 1-2 times nightly  Comorbid conditions: Migraine    PLAN:   HST  I discussed CPAP as the most effective and preferred

## 2024-09-30 NOTE — TELEPHONE ENCOUNTER
Unable to reach patient, message just says that call can not be completed at this time, will try again at a later time

## 2024-09-30 NOTE — PATIENT INSTRUCTIONS
What's next:  Schedule a study with the sleep lab (call them at [phone] if you do not receive their call)  Read through the sleep hygiene tips below to see what kind of changes you can start working on now  Meet with our sleep NP or PA after your sleep study to discuss results, options and recommendations      The Sleep Center at [facility] - [address], [address], [place] [address]  The Sleep center at [facility] - [address], [place] [address]  Phone for both is: [phone] Fax: [phone]    For at home testing: when you come to  the rental unit, please bring:  I.D. and Insurance card  ** Please note the Home Sleep Test Units are limited. It is a 1 night rental and must be dropped off the following day to ensure you are not billed a late or replacement fee. **    Please refrain from or reduce the use of caffeine and/or alcohol prior to your sleep study and avoid napping the day of your study, as these will affect the accuracy of your test results.  If you are ill the day of your test (COVID-19, cold, upper respiratory infection, flu, etc.) please call to reschedule your test as the test will not be accurate, and for other patients' safety. Avoid napping the day prior to your sleep study as this may make it harder to fall asleep.     If you have any questions or need to cancel/reschedule your appointment, please call the sleep lab @ [phone]      For Patients with Obstructive Sleep Apnea:  Never drive a car or operate a motorized vehicle while drowsy or sleepy.  Maintaining an optimal weight can help limit the severity of sleep apnea.  Treating sleep apnea effectively may help reduce the risk of heart disease, stroke, car accidents, type II diabetes & all cause mortality    Sleep Hygiene... Important practices for better sleep:  Have a fixed bedtime and awakening time.  A regular routine is critical to good sleep. Deviate from your typical go to bed and

## 2024-10-01 NOTE — TELEPHONE ENCOUNTER
Patient returned call and is aware of recommendations and new RX sent to pharmacy.    Patient will call office to provide update in 6-8 weeks.    She voiced understanding

## 2024-10-21 LAB — PAP SMEAR, EXTERNAL: NEGATIVE

## 2024-11-14 ENCOUNTER — TELEPHONE (OUTPATIENT)
Dept: SLEEP CENTER | Age: 44
End: 2024-11-14

## 2024-11-14 NOTE — TELEPHONE ENCOUNTER
Called pt on 09/30 and 10/28 to schedule HST pickup - unable to leave a voicemail    Left voicemail on 11/14

## 2024-11-22 SDOH — ECONOMIC STABILITY: FOOD INSECURITY: WITHIN THE PAST 12 MONTHS, THE FOOD YOU BOUGHT JUST DIDN'T LAST AND YOU DIDN'T HAVE MONEY TO GET MORE.: NEVER TRUE

## 2024-11-22 SDOH — ECONOMIC STABILITY: INCOME INSECURITY: HOW HARD IS IT FOR YOU TO PAY FOR THE VERY BASICS LIKE FOOD, HOUSING, MEDICAL CARE, AND HEATING?: NOT HARD AT ALL

## 2024-11-22 SDOH — ECONOMIC STABILITY: FOOD INSECURITY: WITHIN THE PAST 12 MONTHS, YOU WORRIED THAT YOUR FOOD WOULD RUN OUT BEFORE YOU GOT MONEY TO BUY MORE.: NEVER TRUE

## 2024-11-25 ENCOUNTER — OFFICE VISIT (OUTPATIENT)
Dept: FAMILY MEDICINE CLINIC | Age: 44
End: 2024-11-25
Payer: COMMERCIAL

## 2024-11-25 VITALS
WEIGHT: 162 LBS | DIASTOLIC BLOOD PRESSURE: 80 MMHG | HEART RATE: 80 BPM | OXYGEN SATURATION: 96 % | BODY MASS INDEX: 27.17 KG/M2 | SYSTOLIC BLOOD PRESSURE: 126 MMHG

## 2024-11-25 DIAGNOSIS — R63.5 ABNORMAL WEIGHT GAIN: ICD-10-CM

## 2024-11-25 DIAGNOSIS — F41.9 ANXIETY: ICD-10-CM

## 2024-11-25 DIAGNOSIS — G43.909 MIGRAINE WITHOUT STATUS MIGRAINOSUS, NOT INTRACTABLE, UNSPECIFIED MIGRAINE TYPE: ICD-10-CM

## 2024-11-25 DIAGNOSIS — R63.5 ABNORMAL WEIGHT GAIN: Primary | ICD-10-CM

## 2024-11-25 LAB
ANION GAP SERPL CALCULATED.3IONS-SCNC: 9 MMOL/L (ref 3–16)
BUN SERPL-MCNC: 12 MG/DL (ref 7–20)
CALCIUM SERPL-MCNC: 9.1 MG/DL (ref 8.3–10.6)
CHLORIDE SERPL-SCNC: 103 MMOL/L (ref 99–110)
CHOLEST SERPL-MCNC: 193 MG/DL (ref 0–199)
CO2 SERPL-SCNC: 26 MMOL/L (ref 21–32)
CORTIS AM PEAK SERPL-MCNC: 9.5 UG/DL (ref 4.3–22.4)
CREAT SERPL-MCNC: 0.7 MG/DL (ref 0.6–1.1)
EST. AVERAGE GLUCOSE BLD GHB EST-MCNC: 114 MG/DL
ESTRADIOL SERPL-MCNC: 127 PG/ML
FSH SERPL-ACNC: 5.1 MIU/ML
GFR SERPLBLD CREATININE-BSD FMLA CKD-EPI: >90 ML/MIN/{1.73_M2}
GLUCOSE SERPL-MCNC: 79 MG/DL (ref 70–99)
HBA1C MFR BLD: 5.6 %
HDLC SERPL-MCNC: 46 MG/DL (ref 40–60)
LDLC SERPL CALC-MCNC: 116 MG/DL
LH SERPL-ACNC: 11.7 MIU/ML
POTASSIUM SERPL-SCNC: 4.4 MMOL/L (ref 3.5–5.1)
PROGEST SERPL-MCNC: 0.13 NG/ML
SODIUM SERPL-SCNC: 138 MMOL/L (ref 136–145)
T4 FREE SERPL-MCNC: 1.2 NG/DL (ref 0.9–1.8)
TRIGL SERPL-MCNC: 155 MG/DL (ref 0–150)
TSH SERPL DL<=0.005 MIU/L-ACNC: 1.33 UIU/ML (ref 0.27–4.2)
VLDLC SERPL CALC-MCNC: 31 MG/DL

## 2024-11-25 PROCEDURE — 99213 OFFICE O/P EST LOW 20 MIN: CPT | Performed by: NURSE PRACTITIONER

## 2024-11-25 NOTE — PROGRESS NOTES
Patient: Danna Linder is a 44 y.o. female who presents today with the following Chief Complaint(s):  Chief Complaint   Patient presents with    Weight Management     Has gained 10 lbs in 7 weeks from amitriptyline          HPI  Weight gain: states she has gained 10 lbs in the last 7 weeks- same time frame as when she started amittiptyline from her neurologist so she has stopped that- not sure if it was cutting down on migraines and was also causing her to feel tired the next morning       Anxiety: started back on zoloft 50mg 2 weeks ago- had stopped that for the last 10-12 weeks but felt like she needed to go back on it- changes at work have caused some stress.       Would like some labs checked- hormonal labs- discussed I could draw some but she would have to go somewhere else for management/treatment of them if that is what she chose to do.         Current Outpatient Medications   Medication Sig Dispense Refill    sertraline (ZOLOFT) 50 MG tablet Take 1 tablet by mouth daily      rimegepant sulfate 75 MG TBDP Take 75 mg by mouth as needed (migraine) Maximum: 75 mg per 24 hours 8 tablet 11    ondansetron (ZOFRAN) 4 MG tablet Take 1 tablet by mouth every 8 hours as needed for Nausea or Vomiting (Patient taking differently: Take 1 tablet by mouth every 8 hours as needed for Nausea or Vomiting As needed) 20 tablet 5    promethazine (PHENERGAN) 12.5 MG tablet Take 1 tablet by mouth every 8 hours as needed for Nausea 20 tablet 5    montelukast (SINGULAIR) 10 MG tablet TAKE ONE TABLET BY MOUTH DAILY AS NEEDED FOR ALLERGIES 30 tablet 11    triamcinolone (KENALOG) 0.1 % cream Apply topically 2 times daily. 45 g 0    Multiple Vitamins-Minerals (THERAPEUTIC MULTIVITAMIN-MINERALS) tablet Take 1 tablet by mouth daily      RIBOFLAVIN PO Take by mouth      MAGNESIUM PO Take 1 tablet by mouth daily        No current facility-administered medications for this visit.       Patient's past medical history, surgical history,

## 2024-11-25 NOTE — ASSESSMENT & PLAN NOTE
Continue zoloft 50mg for the next couple weeks and see if mood stablizes- if not could try adding wellbutrin- ok for her to send a message and med will be added and then can follow up 1 month from that time

## 2024-11-27 LAB
IGF-I SERPL-MCNC: 144 NG/ML (ref 69–253)
IGF-I Z-SCORE SERPL: 0.1
INSULIN COMMENT: NORMAL
INSULIN REFERENCE RANGE:: NORMAL
INSULIN SERPL-ACNC: 6.3 MU/L
SHBG SERPL-SCNC: 60 NMOL/L (ref 25–122)
TESTOST FREE SERPL-MCNC: 5 PG/ML (ref 1.1–5.8)
TESTOST SERPL-MCNC: 41 NG/DL (ref 8–48)

## 2024-12-02 ENCOUNTER — HOSPITAL ENCOUNTER (OUTPATIENT)
Dept: SLEEP CENTER | Age: 44
Discharge: HOME | End: 2024-12-04
Payer: COMMERCIAL

## 2024-12-02 DIAGNOSIS — G47.30 SLEEP APNEA, UNSPECIFIED TYPE: ICD-10-CM

## 2024-12-02 PROCEDURE — 95806 SLEEP STUDY UNATT&RESP EFFT: CPT

## 2024-12-03 ENCOUNTER — TELEPHONE (OUTPATIENT)
Dept: ADMINISTRATIVE | Age: 44
End: 2024-12-03

## 2024-12-03 ENCOUNTER — OFFICE VISIT (OUTPATIENT)
Dept: NEUROLOGY | Age: 44
End: 2024-12-03
Payer: COMMERCIAL

## 2024-12-03 VITALS
BODY MASS INDEX: 27.14 KG/M2 | HEIGHT: 64 IN | DIASTOLIC BLOOD PRESSURE: 70 MMHG | WEIGHT: 159 LBS | OXYGEN SATURATION: 98 % | SYSTOLIC BLOOD PRESSURE: 113 MMHG | HEART RATE: 97 BPM

## 2024-12-03 DIAGNOSIS — G43.E19 INTRACTABLE CHRONIC MIGRAINE WITH AURA AND WITHOUT STATUS MIGRAINOSUS: Primary | ICD-10-CM

## 2024-12-03 DIAGNOSIS — G47.30 SLEEP APNEA, UNSPECIFIED TYPE: Primary | ICD-10-CM

## 2024-12-03 PROBLEM — G43.711 INTRACTABLE CHRONIC MIGRAINE WITHOUT AURA AND WITH STATUS MIGRAINOSUS: Status: RESOLVED | Noted: 2023-10-12 | Resolved: 2024-12-03

## 2024-12-03 PROCEDURE — 99213 OFFICE O/P EST LOW 20 MIN: CPT | Performed by: STUDENT IN AN ORGANIZED HEALTH CARE EDUCATION/TRAINING PROGRAM

## 2024-12-03 RX ORDER — UBROGEPANT 100 MG/1
100 TABLET ORAL PRN
Qty: 8 TABLET | Refills: 11 | Status: SHIPPED | OUTPATIENT
Start: 2024-12-03

## 2024-12-03 RX ORDER — UBROGEPANT 100 MG/1
100 TABLET ORAL PRN
Qty: 2 TABLET | Refills: 0 | COMMUNITY
Start: 2024-12-03

## 2024-12-03 RX ORDER — UBROGEPANT 100 MG/1
100 TABLET ORAL PRN
Qty: 2 TABLET | Refills: 0
Start: 2024-12-03 | End: 2024-12-03

## 2024-12-03 RX ORDER — ATOGEPANT 60 MG/1
60 TABLET ORAL DAILY
Qty: 30 TABLET | Refills: 11 | Status: SHIPPED | OUTPATIENT
Start: 2024-12-03 | End: 2025-11-28

## 2024-12-03 RX ORDER — ONDANSETRON 4 MG/1
4 TABLET, FILM COATED ORAL EVERY 8 HOURS PRN
Qty: 20 TABLET | Refills: 11 | Status: SHIPPED | OUTPATIENT
Start: 2024-12-03 | End: 2025-11-28

## 2024-12-03 RX ORDER — PROMETHAZINE HYDROCHLORIDE 12.5 MG/1
12.5 TABLET ORAL EVERY 8 HOURS PRN
Qty: 10 TABLET | Refills: 11 | Status: SHIPPED | OUTPATIENT
Start: 2024-12-03

## 2024-12-03 NOTE — ASSESSMENT & PLAN NOTE
Chronic, worsening (exacerbation), changes made today: start Qulipta 60mg daily, stop Nurtec 75mg ODT prn (only modestly effective and some tolerability issues), start Ubrelvy 100mg po prn (provided sample x 2 tablets, failed two triptan medications), continue zofran 4mg prn (first line nausea rescue) and phenergan 12.5mg prn (second line nausea rescue), medication adherence emphasized, and lifestyle modifications recommended. If she continues to have >15 headache days per month after 3 months of Qulipta, then will consider combined Qulipta and Botox injections. However, she has responded favorably for multiple CGRP inhibitors for prophylactic therapy in the past. There is a catamenial component to migraines but menstrual periods are not predictable. If this changes, then long acting triptan could be considered in the future for prophylaxis of catamenial migraines.

## 2024-12-03 NOTE — PROGRESS NOTES
Pended Ubrevly samples given in office  
ineffective        Review of Systems   All other systems reviewed and are negative.         Objective   Neurologic Exam     Mental Status   Oriented to person, place, and time.   Follows 1 step commands.   Attention: normal. Concentration: normal.   Speech: speech is normal   Level of consciousness: alert  Knowledge: consistent with education.   Normal comprehension.     Cranial Nerves     CN III, IV, VI   Extraocular motions are normal.     CN V   Facial sensation intact.     CN VII   Facial expression full, symmetric.     CN VIII   Hearing: intact    CN IX, X   Palate: symmetric    CN XI   Right sternocleidomastoid strength: normal  Left sternocleidomastoid strength: normal  Right trapezius strength: normal  Left trapezius strength: normal    CN XII   Tongue: not atrophic  Fasciculations: absent  Tongue deviation: none    Motor Exam   Muscle bulk: normal  Overall muscle tone: normal  Right arm pronator drift: absent  Left arm pronator drift: absent    Gait, Coordination, and Reflexes     Coordination   Finger to nose coordination: normal    Tremor   Resting tremor: absent  Action tremor: absent    Reflexes   Right patellar: 3+  Left patellar: 3+         On this date 12/3/2024 I have spent 26 minutes reviewing previous notes, test results and face to face with the patient discussing the diagnosis and importance of compliance with the treatment plan as well as documenting on the day of the visit.

## 2024-12-03 NOTE — PROGRESS NOTES
Reviewed HST result with patient.  High probability of false negative HST.  I recommend proceeding to the sleep lab for PSG.

## 2024-12-03 NOTE — TELEPHONE ENCOUNTER
Submitted PA for Ubrelvy 100MG tablets  Via [facility] [id number] STATUS: PENDING.    Follow up done daily; if no decision with in three days we will refax.  If another three days goes by with no decision will call the insurance for status.

## 2024-12-03 NOTE — TELEPHONE ENCOUNTER
Patient updated on the approval for Ubrelvy 100 mg tablets.      Can you tell me if pa has been started for the Oulipta?

## 2024-12-04 ENCOUNTER — TELEPHONE (OUTPATIENT)
Dept: ADMINISTRATIVE | Age: 44
End: 2024-12-04

## 2024-12-04 NOTE — TELEPHONE ENCOUNTER
The medication is APPROVED.    Outcome  Approved today by [facility] 2017  Your PA request has been approved. Additional information will be provided in the approval communication. (Message 1146)  Authorization Expiration Date: 3/4/2025    If this requires a response please respond to the pool ( P MHCX PSC MEDICATION PRE-AUTH).      Thank you please advise patient.

## 2024-12-04 NOTE — TELEPHONE ENCOUNTER
Spoke with pharmacy and they are updated about approval for Qulipta 60 mg.     Writer left message on patient's vm that medication Qulipta 60 mg was approved

## 2024-12-04 NOTE — TELEPHONE ENCOUNTER
Submitted PA for Qulipta 60MG tablets  Via [facility] [id number] STATUS: PENDING.    Follow up done daily; if no decision with in three days we will refax.  If another three days goes by with no decision will call the insurance for status.

## 2024-12-05 ENCOUNTER — TELEPHONE (OUTPATIENT)
Dept: SLEEP CENTER | Age: 44
End: 2024-12-05

## 2024-12-16 DIAGNOSIS — F41.9 ANXIETY: ICD-10-CM

## 2024-12-16 NOTE — TELEPHONE ENCOUNTER
Last Office Visit  -  11/25/24  Next Office Visit  -  n/a    Last Filled  -  7/25/24  Last UDS -    Contract -

## 2024-12-16 NOTE — TELEPHONE ENCOUNTER
[facility] is requesting a 90 day rx for sertraline (ZOLOFT) 50 MG tablet .      Last OV 11/25/24    Next office visit   Visit date not found

## 2024-12-26 ENCOUNTER — HOSPITAL ENCOUNTER (OUTPATIENT)
Dept: SLEEP CENTER | Age: 44
Discharge: HOME | End: 2024-12-28
Payer: COMMERCIAL

## 2024-12-26 DIAGNOSIS — G47.30 SLEEP APNEA, UNSPECIFIED TYPE: ICD-10-CM

## 2024-12-26 PROCEDURE — 95810 POLYSOM 6/> YRS 4/> PARAM: CPT

## 2025-01-10 ENCOUNTER — TELEPHONE (OUTPATIENT)
Dept: ADMINISTRATIVE | Age: 45
End: 2025-01-10

## 2025-01-10 NOTE — TELEPHONE ENCOUNTER
Submitted PA for Qulipta Via CM Key: [id number] STATUS: APPROVED 1/10/2025-4/10/2025.    Authorization Expiration Date: 4/10/2025.    If this requires a response please respond to the pool ( P MHCX PSC MEDICATION PRE-AUTH).      Thank you please advise patient.

## 2025-01-22 ENCOUNTER — OFFICE VISIT (OUTPATIENT)
Dept: PULMONOLOGY | Age: 45
End: 2025-01-22
Payer: COMMERCIAL

## 2025-01-22 VITALS
SYSTOLIC BLOOD PRESSURE: 127 MMHG | HEIGHT: 64 IN | BODY MASS INDEX: 27.83 KG/M2 | RESPIRATION RATE: 16 BRPM | TEMPERATURE: 98.1 F | OXYGEN SATURATION: 96 % | HEART RATE: 86 BPM | DIASTOLIC BLOOD PRESSURE: 61 MMHG | WEIGHT: 163 LBS

## 2025-01-22 DIAGNOSIS — F51.01 PRIMARY INSOMNIA: Primary | ICD-10-CM

## 2025-01-22 PROCEDURE — 99213 OFFICE O/P EST LOW 20 MIN: CPT | Performed by: INTERNAL MEDICINE

## 2025-01-22 ASSESSMENT — SLEEP AND FATIGUE QUESTIONNAIRES
HOW LIKELY ARE YOU TO NOD OFF OR FALL ASLEEP WHILE SITTING AND READING: SLIGHT CHANCE OF DOZING
HOW LIKELY ARE YOU TO NOD OFF OR FALL ASLEEP WHILE SITTING AND READING: SLIGHT CHANCE OF DOZING
HOW LIKELY ARE YOU TO NOD OFF OR FALL ASLEEP WHILE SITTING INACTIVE IN A PUBLIC PLACE: WOULD NEVER DOZE
ESS TOTAL SCORE: 5
HOW LIKELY ARE YOU TO NOD OFF OR FALL ASLEEP WHILE LYING DOWN TO REST IN THE AFTERNOON WHEN CIRCUMSTANCES PERMIT: HIGH CHANCE OF DOZING
HOW LIKELY ARE YOU TO NOD OFF OR FALL ASLEEP WHILE SITTING AND TALKING TO SOMEONE: WOULD NEVER DOZE
HOW LIKELY ARE YOU TO NOD OFF OR FALL ASLEEP IN A CAR, WHILE STOPPED FOR A FEW MINUTES IN TRAFFIC: WOULD NEVER DOZE
HOW LIKELY ARE YOU TO NOD OFF OR FALL ASLEEP WHILE SITTING QUIETLY AFTER LUNCH WITHOUT ALCOHOL: WOULD NEVER DOZE
HOW LIKELY ARE YOU TO NOD OFF OR FALL ASLEEP WHILE LYING DOWN TO REST IN THE AFTERNOON WHEN CIRCUMSTANCES PERMIT: HIGH CHANCE OF DOZING
HOW LIKELY ARE YOU TO NOD OFF OR FALL ASLEEP WHEN YOU ARE A PASSENGER IN A CAR FOR AN HOUR WITHOUT A BREAK: WOULD NEVER DOZE
HOW LIKELY ARE YOU TO NOD OFF OR FALL ASLEEP WHILE WATCHING TV: SLIGHT CHANCE OF DOZING
HOW LIKELY ARE YOU TO NOD OFF OR FALL ASLEEP WHILE SITTING AND TALKING TO SOMEONE: WOULD NEVER DOZE
HOW LIKELY ARE YOU TO NOD OFF OR FALL ASLEEP IN A CAR, WHILE STOPPED FOR A FEW MINUTES IN TRAFFIC: WOULD NEVER DOZE
HOW LIKELY ARE YOU TO NOD OFF OR FALL ASLEEP WHILE SITTING INACTIVE IN A PUBLIC PLACE: WOULD NEVER DOZE
HOW LIKELY ARE YOU TO NOD OFF OR FALL ASLEEP WHEN YOU ARE A PASSENGER IN A CAR FOR AN HOUR WITHOUT A BREAK: WOULD NEVER DOZE
HOW LIKELY ARE YOU TO NOD OFF OR FALL ASLEEP WHILE SITTING QUIETLY AFTER LUNCH WITHOUT ALCOHOL: WOULD NEVER DOZE
HOW LIKELY ARE YOU TO NOD OFF OR FALL ASLEEP WHILE WATCHING TV: SLIGHT CHANCE OF DOZING

## 2025-01-22 NOTE — PATIENT INSTRUCTIONS
Please remember to bring a list of medications and any CPAP or BiPAP machines to your next appointment with the office.     Out of respect for other patients and providers, we ask that you arrive no later than your scheduled appointment time.  If you arrive later than your appointment time, you may be asked to reschedule your appointment.     Late cancellations result in other patients being unable to utilize the appointment slot to see their physician.  Please avoid cancelling your appointment less than 1 week prior to the appointment date.  Patients with multiple missed appointments within 2 years may be dismissed from the practice.     In the next few weeks, you will be receiving a survey from [email or web address] regarding your visit today.  Your input is valuable to us & surveys are regularly reviewed by [facility] leadership.  It is very important to us that our patients receive excellent care.  If your experience was excellent, please let us know!  If your experience was not a good one, please tell us so we can make needed corrections. We hope you are comfortable recommending us to others for their healthcare needs.     We thank you for choosing [email or web address]!
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
## 2025-01-22 NOTE — PROGRESS NOTES
MA Communication:  The following orders are received by verbal communication from Jasson Klein MD        Orders include:        No follow up needed

## 2025-03-05 DIAGNOSIS — F32.A DEPRESSION, UNSPECIFIED DEPRESSION TYPE: Primary | ICD-10-CM

## 2025-03-05 RX ORDER — BUPROPION HYDROCHLORIDE 150 MG/1
150 TABLET ORAL EVERY MORNING
Qty: 30 TABLET | Refills: 3 | Status: SHIPPED | OUTPATIENT
Start: 2025-03-05

## 2025-04-10 ENCOUNTER — TELEPHONE (OUTPATIENT)
Dept: ADMINISTRATIVE | Age: 45
End: 2025-04-10

## 2025-04-10 NOTE — TELEPHONE ENCOUNTER
Submitted PA for Qulipta 60MG tablets  Via [facility] [id number]  STATUS: PENDING.    Follow up done daily; if no decision with in three days we will refax.  If another three days goes by with no decision will call the insurance for status.

## 2025-04-11 NOTE — TELEPHONE ENCOUNTER
The medication is APPROVED.    Outcome  Approved today by [facility] 2017  Your PA request has been approved. Additional information will be provided in the approval communication. (Message 1141)  Effective Date: 4/11/2025  Authorization Expiration Date: 4/11/2026    If this requires a response please respond to the pool ( P MHCX PSC MEDICATION PRE-AUTH).      Thank you please advise patient.

## 2025-05-29 ENCOUNTER — OFFICE VISIT (OUTPATIENT)
Dept: FAMILY MEDICINE CLINIC | Age: 45
End: 2025-05-29
Payer: COMMERCIAL

## 2025-05-29 VITALS
WEIGHT: 159.6 LBS | HEART RATE: 102 BPM | BODY MASS INDEX: 27.25 KG/M2 | SYSTOLIC BLOOD PRESSURE: 104 MMHG | DIASTOLIC BLOOD PRESSURE: 80 MMHG | HEIGHT: 64 IN | OXYGEN SATURATION: 98 %

## 2025-05-29 DIAGNOSIS — Z00.00 PHYSICAL EXAM: Primary | ICD-10-CM

## 2025-05-29 PROCEDURE — 99396 PREV VISIT EST AGE 40-64: CPT | Performed by: NURSE PRACTITIONER

## 2025-05-29 SDOH — ECONOMIC STABILITY: FOOD INSECURITY: WITHIN THE PAST 12 MONTHS, YOU WORRIED THAT YOUR FOOD WOULD RUN OUT BEFORE YOU GOT MONEY TO BUY MORE.: NEVER TRUE

## 2025-05-29 SDOH — ECONOMIC STABILITY: FOOD INSECURITY: WITHIN THE PAST 12 MONTHS, THE FOOD YOU BOUGHT JUST DIDN'T LAST AND YOU DIDN'T HAVE MONEY TO GET MORE.: NEVER TRUE

## 2025-05-29 ASSESSMENT — PATIENT HEALTH QUESTIONNAIRE - PHQ9
1. LITTLE INTEREST OR PLEASURE IN DOING THINGS: SEVERAL DAYS
5. POOR APPETITE OR OVEREATING: NOT AT ALL
7. TROUBLE CONCENTRATING ON THINGS, SUCH AS READING THE NEWSPAPER OR WATCHING TELEVISION: NOT AT ALL
3. TROUBLE FALLING OR STAYING ASLEEP: SEVERAL DAYS
9. THOUGHTS THAT YOU WOULD BE BETTER OFF DEAD, OR OF HURTING YOURSELF: NOT AT ALL
SUM OF ALL RESPONSES TO PHQ QUESTIONS 1-9: 3
6. FEELING BAD ABOUT YOURSELF - OR THAT YOU ARE A FAILURE OR HAVE LET YOURSELF OR YOUR FAMILY DOWN: NOT AT ALL
SUM OF ALL RESPONSES TO PHQ QUESTIONS 1-9: 3
SUM OF ALL RESPONSES TO PHQ QUESTIONS 1-9: 3
4. FEELING TIRED OR HAVING LITTLE ENERGY: SEVERAL DAYS
2. FEELING DOWN, DEPRESSED OR HOPELESS: NOT AT ALL
8. MOVING OR SPEAKING SO SLOWLY THAT OTHER PEOPLE COULD HAVE NOTICED. OR THE OPPOSITE, BEING SO FIGETY OR RESTLESS THAT YOU HAVE BEEN MOVING AROUND A LOT MORE THAN USUAL: NOT AT ALL
10. IF YOU CHECKED OFF ANY PROBLEMS, HOW DIFFICULT HAVE THESE PROBLEMS MADE IT FOR YOU TO DO YOUR WORK, TAKE CARE OF THINGS AT HOME, OR GET ALONG WITH OTHER PEOPLE: SOMEWHAT DIFFICULT
SUM OF ALL RESPONSES TO PHQ QUESTIONS 1-9: 3

## 2025-05-29 ASSESSMENT — ENCOUNTER SYMPTOMS
NAUSEA: 1
CONSTIPATION: 0
SORE THROAT: 0
DIARRHEA: 0
WHEEZING: 0
SHORTNESS OF BREATH: 0
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
## 2025-06-02 ENCOUNTER — OFFICE VISIT (OUTPATIENT)
Dept: NEUROLOGY | Age: 45
End: 2025-06-02
Payer: COMMERCIAL

## 2025-06-02 VITALS
SYSTOLIC BLOOD PRESSURE: 119 MMHG | HEIGHT: 64 IN | HEART RATE: 86 BPM | BODY MASS INDEX: 27.14 KG/M2 | OXYGEN SATURATION: 97 % | WEIGHT: 159 LBS | DIASTOLIC BLOOD PRESSURE: 73 MMHG

## 2025-06-02 DIAGNOSIS — G43.119 INTRACTABLE MIGRAINE WITH AURA WITHOUT STATUS MIGRAINOSUS: Primary | ICD-10-CM

## 2025-06-02 PROCEDURE — 99213 OFFICE O/P EST LOW 20 MIN: CPT | Performed by: STUDENT IN AN ORGANIZED HEALTH CARE EDUCATION/TRAINING PROGRAM

## 2025-06-02 RX ORDER — UBROGEPANT 100 MG/1
100 TABLET ORAL PRN
Qty: 16 TABLET | Refills: 11 | Status: SHIPPED | OUTPATIENT
Start: 2025-06-02

## 2025-06-02 RX ORDER — ATOGEPANT 60 MG/1
60 TABLET ORAL DAILY
Qty: 90 TABLET | Refills: 3 | Status: SHIPPED | OUTPATIENT
Start: 2025-06-02 | End: 2026-05-28

## 2025-06-02 NOTE — PROGRESS NOTES
History of Present Illness  Follow-up visit.    She was last seen in 12/2024, at which time she was prescribed Qulipta and Ubrelvy for breakthrough headaches. Despite this treatment, she continues to experience migraines at a frequency of once per week. The severity of her headaches has increased since the onset of spring, with a particularly intense episode occurring last night that disrupted her sleep. Prior to the initiation of Qulipta, she was experiencing more than 15 headache days per month. Ubrelvy is effective as a rescue therapy, although it takes some time to take effect. Headaches are often accompanied by nausea. She does not experience any prodromal symptoms such as neck stiffness, brain fog, or fatigue. However, light sensitivity occurs during aura headaches, which are infrequent. Visual disturbances, such as seeing spots, also occur during aura headaches, with the most recent episode occurring in 07/2024. Identifying potential triggers for her headaches has proven challenging. She reports no issues with constipation. Milder headaches are managed with ibuprofen, which she finds effective.    INTERVAL: Since last visit, she continues to experience migraines once per week. The severity of her headaches has increased since the onset of spring. She reports that Ubrelvy is effective as a rescue therapy, although it takes some time to take effect. She also notes that her headaches are often accompanied by nausea.    /73 (BP Site: Left Upper Arm)   Pulse 86   Ht 1.626 m (5' 4\")   Wt 72.1 kg (159 lb)   SpO2 97%   BMI 27.29 kg/m²     Physical Exam      Neurological Exam  Mental Status  Awake, alert and oriented to person, place and time. Speech is normal.    Cranial Nerves  CN VII:  Right: There is no facial weakness.  Left: There is no facial weakness.       Results      Assessment & Plan  1.  Episodic migraine with aura without status migrainosus intractable  She has had marked improvement in

## 2025-06-15 DIAGNOSIS — Z91.09 ENVIRONMENTAL ALLERGIES: ICD-10-CM

## 2025-06-15 RX ORDER — MONTELUKAST SODIUM 10 MG/1
10 TABLET ORAL DAILY PRN
Qty: 30 TABLET | Refills: 11 | Status: SHIPPED | OUTPATIENT
Start: 2025-06-15

## 2025-07-12 DIAGNOSIS — F32.A DEPRESSION, UNSPECIFIED DEPRESSION TYPE: ICD-10-CM

## 2025-07-14 RX ORDER — BUPROPION HYDROCHLORIDE 150 MG/1
150 TABLET ORAL EVERY MORNING
Qty: 30 TABLET | Refills: 11 | Status: SHIPPED | OUTPATIENT
Start: 2025-07-14

## (undated) DEVICE — SPONGE,NEURO,1"X3",XR,STRL,LF,10/PK: Brand: MEDLINE

## (undated) DEVICE — Z DISCONTINUED GLOVE SURG SZ 7 L12IN FNGR THK13MIL WHT ISOLEX POLYISOPRENE

## (undated) DEVICE — PACK PROCEDURE SURG ORAL CDS

## (undated) DEVICE — DRILL FOR 12MM SCREWS,STRYKER SHAFT

## (undated) DEVICE — SUTURE PLN GUT SZ 5-0 L18IN ABSRB YELLOWISH TAN L13MM PC-1 1915G

## (undated) DEVICE — Z INACTIVE PER BARD TRAY CATH W/ 16FR DRNGE BG STATLOK F STBL DEV W/

## (undated) DEVICE — Z DISCONTINUED GLOVE SURG SZ 7.5 L12IN FNGR THK13MIL WHT ISOLEX

## (undated) DEVICE — SUTURE CHROMIC GUT SZ 3-0 L27IN ABSRB BRN L26MM SH 1/2 CIR G122H

## (undated) DEVICE — ROUND BALL CUTTER,DIAMOND, LONG, 3MM DIA.: Brand: MICROAIRE®

## (undated) DEVICE — SUTURE PERMA HND 2-0 L18IN NONABSORBABLE BLK X-1 L22IN 1/2 737G

## (undated) DEVICE — SUTURE STL SZ 0 L18IN NONABSORBABLE UD TIE PRE-CUT MFIL DS26

## (undated) DEVICE — SUTURE PDS II SZ 2-0 L27IN ABSRB VLT L36MM CT-1 1/2 CIR Z339H

## (undated) DEVICE — MICRODISSECTION NEEDLE STRAIGHT SLEEVE: Brand: COLORADO

## (undated) DEVICE — TRAY PREP DRY W/ PREM GLV 2 APPL 6 SPNG 2 UNDPD 1 OVERWRAP

## (undated) DEVICE — SUTURE VCRL + SZ 3-0 L18IN ABSRB UD PS-2 3/8 CIR REV CUT VCP497H

## (undated) DEVICE — BATTERY PACK FOR VARISPEED: Brand: STRYKER VARISPEED

## (undated) DEVICE — IRRIGATION SUCTION CASSETTE: Brand: SONOPET IQ

## (undated) DEVICE — SKIN MARKER REGULAR TIP WITH RULER CAP AND LABELS: Brand: DEVON

## (undated) DEVICE — SYRINGE MED 10ML POLYPR GEN PURP FLAT TOP LUERLOCK TIP

## (undated) DEVICE — MAGNETIC INSTRUMENT PAD 10" X 16"; MEDIUM; DISPOSABLE: Brand: CARDINAL HEALTH

## (undated) DEVICE — K WIRE FIX L229MM DIA1.1MM S STL SGL TRCR PNT STYL SMOOTH

## (undated) DEVICE — Device

## (undated) DEVICE — DRESSING TEGADERM CLR ACRYLIC OVAL SM 3X3.75IN

## (undated) DEVICE — 11CM IQ APEX KNIFE: Brand: SONOPET IQ

## (undated) DEVICE — SPLINT VSP ORTHOGNATHIC BUNDLE W/WEBEX

## (undated) DEVICE — DRILL FOR 8MM SCREWS, STRYKER SHAFT

## (undated) DEVICE — SUTURE

## (undated) DEVICE — Z DISCONTINUED USE 2275683 GLOVE SURG SZ 6.5 L12IN FNGR THK13MIL WHT ISOLEX

## (undated) DEVICE — SUTURE S STL SZ 1-0 L18IN NONABSORBABLE SIL W/O NDL DS25

## (undated) DEVICE — GLOVE ORANGE PI 7 1/2   MSG9075

## (undated) DEVICE — Z DISCONTINUED NO SUB IDED GLOVE SURG SZ 6 L12IN FNGR THK13MIL WHT ISOLEX POLYISOPRENE

## (undated) DEVICE — Z DISCONTINUED NO SUB IDED CL MASK COMPR LOWER FACE

## (undated) DEVICE — PENCIL ES CRD L10FT HND SWCHING ROCK SWCH W/ EDGE COAT BLDE

## (undated) DEVICE — SYRINGE IRRIG 60ML SFT PLIABLE BLB EZ TO GRP 1 HND USE W/

## (undated) DEVICE — NEEDLE HYPO 25GA L1.5IN BLU POLYPR HUB S STL REG BVL STR

## (undated) DEVICE — GLOVE ORANGE PI 8   MSG9080

## (undated) DEVICE — SOLUTION IV IRRIG POUR BRL 0.9% SODIUM CHL 2F7124

## (undated) DEVICE — Z CONVERTED USE 2276055 BANDAGE ADH FLX FAB STRP 1INX3IN